# Patient Record
Sex: MALE | Race: WHITE | NOT HISPANIC OR LATINO | Employment: FULL TIME | URBAN - METROPOLITAN AREA
[De-identification: names, ages, dates, MRNs, and addresses within clinical notes are randomized per-mention and may not be internally consistent; named-entity substitution may affect disease eponyms.]

---

## 2017-01-11 DIAGNOSIS — E03.4 HYPOTHYROIDISM DUE TO ACQUIRED ATROPHY OF THYROID: Primary | Chronic | ICD-10-CM

## 2017-01-11 RX ORDER — LEVOTHYROXINE SODIUM 100 UG/1
100 TABLET ORAL DAILY
Qty: 90 TABLET | Refills: 0 | Status: SHIPPED | OUTPATIENT
Start: 2017-01-11 | End: 2017-04-14

## 2017-01-11 NOTE — TELEPHONE ENCOUNTER
levothyroxine      Last Written Prescription Date: 10/31/16  Last Quantity: 90, # refills: 0  Last Office Visit with G, P or [facility] prescribing provider: 10/27/16        TSH   Date Value Ref Range Status   10/28/2016 4.80* 0.40 - 4.00 mU/L Final

## 2017-01-11 NOTE — TELEPHONE ENCOUNTER
Prescription approved per [facility] Refill Protocol or patient Primary care provider (PCP)  ISAAK Tello RN/Real [place]

## 2017-02-16 ENCOUNTER — TRANSFERRED RECORDS (OUTPATIENT)
Dept: HEALTH INFORMATION MANAGEMENT | Facility: CLINIC | Age: 62
End: 2017-02-16

## 2017-03-30 ENCOUNTER — MYC MEDICAL ADVICE (OUTPATIENT)
Dept: FAMILY MEDICINE | Facility: CLINIC | Age: 62
End: 2017-03-30

## 2017-03-30 DIAGNOSIS — E03.4 HYPOTHYROIDISM DUE TO ACQUIRED ATROPHY OF THYROID: Primary | Chronic | ICD-10-CM

## 2017-04-07 ENCOUNTER — TELEPHONE (OUTPATIENT)
Dept: LAB | Facility: CLINIC | Age: 62
End: 2017-04-07

## 2017-04-07 DIAGNOSIS — R53.83 FATIGUE, UNSPECIFIED TYPE: Primary | ICD-10-CM

## 2017-04-07 DIAGNOSIS — E03.4 HYPOTHYROIDISM DUE TO ACQUIRED ATROPHY OF THYROID: Primary | Chronic | ICD-10-CM

## 2017-04-07 DIAGNOSIS — E03.4 HYPOTHYROIDISM DUE TO ACQUIRED ATROPHY OF THYROID: Chronic | ICD-10-CM

## 2017-04-07 LAB
FERRITIN SERPL-MCNC: 165 NG/ML (ref 26–388)
T3FREE SERPL-MCNC: 1.9 PG/ML (ref 2.3–4.2)
T4 FREE SERPL-MCNC: 1.16 NG/DL (ref 0.76–1.46)
TSH SERPL DL<=0.05 MIU/L-ACNC: 3.27 MU/L (ref 0.4–4)

## 2017-04-07 PROCEDURE — 84439 ASSAY OF FREE THYROXINE: CPT | Performed by: FAMILY MEDICINE

## 2017-04-07 PROCEDURE — 86376 MICROSOMAL ANTIBODY EACH: CPT | Performed by: FAMILY MEDICINE

## 2017-04-07 PROCEDURE — 82728 ASSAY OF FERRITIN: CPT | Performed by: NURSE PRACTITIONER

## 2017-04-07 PROCEDURE — 84443 ASSAY THYROID STIM HORMONE: CPT | Performed by: FAMILY MEDICINE

## 2017-04-07 PROCEDURE — 36415 COLL VENOUS BLD VENIPUNCTURE: CPT | Performed by: FAMILY MEDICINE

## 2017-04-07 PROCEDURE — 84481 FREE ASSAY (FT-3): CPT | Performed by: FAMILY MEDICINE

## 2017-04-07 NOTE — TELEPHONE ENCOUNTER
I drew Jet on Friday, 4/7/17. He ask to have his ferritin level checked. I told him that I would ask you. If you would like to add this on please place a future order. Thank You

## 2017-04-07 NOTE — PROGRESS NOTES
Jet,    I am helping to cover some of Dr. Boyce results since she is out of the office.     Normal/Negative ferritin    Please call or email with any additional questions or concerns  DURGA Arreola CNP

## 2017-04-10 LAB — THYROPEROXIDASE AB SERPL-ACNC: 317 IU/ML

## 2017-04-14 DIAGNOSIS — E03.4 HYPOTHYROIDISM DUE TO ACQUIRED ATROPHY OF THYROID: Chronic | ICD-10-CM

## 2017-04-14 RX ORDER — LEVOTHYROXINE SODIUM 100 UG/1
100 TABLET ORAL DAILY
Qty: 90 TABLET | Refills: 1 | Status: SHIPPED | OUTPATIENT
Start: 2017-04-14 | End: 2017-09-23

## 2017-04-14 NOTE — TELEPHONE ENCOUNTER
Levothyroxine 100mcg     Last Written Prescription Date: 01/11/2017  #90 x 0  Last filled - not provided, e-refill request  Last Office Visit with [facility], [facility] or [facility] prescribing provider: 10/27/2016 PEDRO Fraire        TSH   Date Value Ref Range Status   04/07/2017 3.27 0.40 - 4.00 mU/L Final

## 2017-04-14 NOTE — TELEPHONE ENCOUNTER
Prescription approved per [facility] Refill Protocol.  Vicky Marin RN

## 2017-09-23 DIAGNOSIS — E03.4 HYPOTHYROIDISM DUE TO ACQUIRED ATROPHY OF THYROID: Chronic | ICD-10-CM

## 2017-09-25 RX ORDER — LEVOTHYROXINE SODIUM 100 UG/1
TABLET ORAL
Qty: 90 TABLET | Refills: 0 | Status: SHIPPED | OUTPATIENT
Start: 2017-09-25 | End: 2017-12-11

## 2017-09-25 NOTE — TELEPHONE ENCOUNTER
Levothyroxine 100mcg     Last Written Prescription Date: 04/14/2017 #90 x 1  Last filled 06/26/2017  Last Office Visit with G, P or [facility] prescribing provider: 10/27/2016 PEDRO Fraire        TSH   Date Value Ref Range Status   04/07/2017 3.27 0.40 - 4.00 mU/L Final

## 2017-10-25 ENCOUNTER — TELEPHONE (OUTPATIENT)
Dept: FAMILY MEDICINE | Facility: CLINIC | Age: 62
End: 2017-10-25

## 2017-10-25 DIAGNOSIS — E78.5 HYPERLIPIDEMIA LDL GOAL <130: ICD-10-CM

## 2017-10-30 RX ORDER — SIMVASTATIN 20 MG
TABLET ORAL
Qty: 90 TABLET | Refills: 0 | Status: SHIPPED | OUTPATIENT
Start: 2017-10-30 | End: 2018-01-30

## 2017-10-30 NOTE — TELEPHONE ENCOUNTER
Left detailed message for patient to return call to clinic on his self identified voicemail, he is due for OV and fasting labs, both >1 year ago, patient to let clinic know if he is out of Simvastatin    Erika VALENZUELA Rn

## 2017-10-30 NOTE — TELEPHONE ENCOUNTER
Medication is being filled for 1 time refill only due to: [facility]chart [facility] to [facility] and get labs done  Over due for office visit and/or labs   ISAAK Tello  RN/Real [place]

## 2017-12-01 ENCOUNTER — TELEPHONE (OUTPATIENT)
Dept: LAB | Facility: CLINIC | Age: 62
End: 2017-12-01

## 2017-12-01 ENCOUNTER — TELEPHONE (OUTPATIENT)
Dept: FAMILY MEDICINE | Facility: CLINIC | Age: 62
End: 2017-12-01

## 2017-12-01 DIAGNOSIS — E78.5 HYPERLIPIDEMIA LDL GOAL <130: Primary | Chronic | ICD-10-CM

## 2017-12-01 DIAGNOSIS — Z12.5 SCREENING FOR PROSTATE CANCER: ICD-10-CM

## 2017-12-01 DIAGNOSIS — E03.4 HYPOTHYROIDISM DUE TO ACQUIRED ATROPHY OF THYROID: Chronic | ICD-10-CM

## 2017-12-01 NOTE — TELEPHONE ENCOUNTER
Patient coming in for lab work on Monday. It addition to labs already entered, patient would like the following labs done as well: Thyroid T3 and T4, Liver function test.    Please enter lab orders.

## 2017-12-01 NOTE — TELEPHONE ENCOUNTER
Patient coming in for lab work on Monday, 12/04/17.         Patient is requesting fasting labs.      Please place future orders. Thanks

## 2017-12-01 NOTE — TELEPHONE ENCOUNTER
I ordered the ALT which is a liver test and is typically monitored periodically with his simvastatin.    The T3 and T4 are not really indicated.  We did those last year based on some additional concerns/questions he was having.  I would not recommend they be done yearly as part of his monitoring.    If he has additional concerns he would lie to address it might be best for us to visit first.  The lipid panel is the only lab that needs to be fasting.    Antonia Fraire

## 2017-12-01 NOTE — TELEPHONE ENCOUNTER
Left a detailed message for patient with Dr. Fraire's response below. It was a personal mobile phone that I left the message on. Gave patient information to call us back with any further questions or concerns.    Vicky Marin RN

## 2017-12-01 NOTE — TELEPHONE ENCOUNTER
Routing to Dr. Fraire to review and order if the requested labs are necessary.    Vicky Marin RN

## 2017-12-04 DIAGNOSIS — Z12.5 SCREENING FOR PROSTATE CANCER: ICD-10-CM

## 2017-12-04 DIAGNOSIS — E03.4 HYPOTHYROIDISM DUE TO ACQUIRED ATROPHY OF THYROID: Chronic | ICD-10-CM

## 2017-12-04 DIAGNOSIS — E78.5 HYPERLIPIDEMIA LDL GOAL <130: Chronic | ICD-10-CM

## 2017-12-04 DIAGNOSIS — R53.83 FATIGUE, UNSPECIFIED TYPE: ICD-10-CM

## 2017-12-04 LAB
ALT SERPL W P-5'-P-CCNC: 34 U/L (ref 0–70)
CHOLEST SERPL-MCNC: 157 MG/DL
FERRITIN SERPL-MCNC: 130 NG/ML (ref 26–388)
HDLC SERPL-MCNC: 82 MG/DL
LDLC SERPL CALC-MCNC: 63 MG/DL
NONHDLC SERPL-MCNC: 75 MG/DL
PSA SERPL-ACNC: 0.82 UG/L (ref 0–4)
TRIGL SERPL-MCNC: 59 MG/DL
TSH SERPL DL<=0.005 MIU/L-ACNC: 0.42 MU/L (ref 0.4–4)

## 2017-12-04 PROCEDURE — G0103 PSA SCREENING: HCPCS | Performed by: FAMILY MEDICINE

## 2017-12-04 PROCEDURE — 84443 ASSAY THYROID STIM HORMONE: CPT | Performed by: FAMILY MEDICINE

## 2017-12-04 PROCEDURE — 36415 COLL VENOUS BLD VENIPUNCTURE: CPT | Performed by: FAMILY MEDICINE

## 2017-12-04 PROCEDURE — 80061 LIPID PANEL: CPT | Performed by: FAMILY MEDICINE

## 2017-12-04 PROCEDURE — 82728 ASSAY OF FERRITIN: CPT | Performed by: FAMILY MEDICINE

## 2017-12-04 PROCEDURE — 84460 ALANINE AMINO (ALT) (SGPT): CPT | Performed by: FAMILY MEDICINE

## 2017-12-11 ENCOUNTER — OFFICE VISIT (OUTPATIENT)
Dept: FAMILY MEDICINE | Facility: CLINIC | Age: 62
End: 2017-12-11
Payer: COMMERCIAL

## 2017-12-11 VITALS
HEART RATE: 72 BPM | BODY MASS INDEX: 20.88 KG/M2 | WEIGHT: 133 LBS | HEIGHT: 67 IN | TEMPERATURE: 97.4 F | DIASTOLIC BLOOD PRESSURE: 72 MMHG | SYSTOLIC BLOOD PRESSURE: 102 MMHG

## 2017-12-11 DIAGNOSIS — E78.5 HYPERLIPIDEMIA LDL GOAL <130: Chronic | ICD-10-CM

## 2017-12-11 DIAGNOSIS — E03.4 HYPOTHYROIDISM DUE TO ACQUIRED ATROPHY OF THYROID: Primary | Chronic | ICD-10-CM

## 2017-12-11 PROCEDURE — 99213 OFFICE O/P EST LOW 20 MIN: CPT | Performed by: FAMILY MEDICINE

## 2017-12-11 RX ORDER — NIACINAMIDE 500 MG
1 TABLET, EXTENDED RELEASE ORAL 3 TIMES DAILY
COMMUNITY

## 2017-12-11 RX ORDER — ZINC OXIDE 13 %
1-2 CREAM (GRAM) TOPICAL DAILY
COMMUNITY

## 2017-12-11 NOTE — NURSING NOTE
"Initial /72  Pulse 72  Temp 97.4  F (36.3  C) (Tympanic)  Ht 5' 7.25\" (1.708 m)  Wt 133 lb (60.3 kg)  BMI 20.68 kg/m2 Estimated body mass index is 20.68 kg/(m^2) as calculated from the following:    Height as of this encounter: 5' 7.25\" (1.708 m).    Weight as of this encounter: 133 lb (60.3 kg). .      "

## 2017-12-11 NOTE — PROGRESS NOTES
"  SUBJECTIVE:   Jet Granger is a 62 year old male who presents to clinic today for the following health issues:    Hyperlipidemia Follow-Up      Rate your low fat/cholesterol diet?: good    Taking statin?  Yes, no muscle aches from statin    Other lipid medications/supplements?:  Fish oil/Omega 3, without side effects    Hypothyroidism Follow-up      Since last visit, patient describes the following symptoms: Weight stable, no hair loss, no skin changes, no constipation, no loose stools      Has been seeing a functional doctor for treatment due to a variety of symptoms such as insomnia, GI distress and fatigue.  Doing lots of different things but overall has been feeling better.  Energy is somewhat improved and GI distress essentially resolved.    Taking 130grains daily.      Has been on Natur-throid through his functional doctor.        Was treated for small intestineal bacterial overgrowth.  Has been trying to \"get his bowels in shape\".    Feels sleep is better.      Amount of exercise or physical activity: 6-7 days/week for an average of 30-45 minutes    Problems taking medications regularly: No    Medication side effects: none    Diet: regular (no restrictions)      Problem list and histories reviewed & adjusted, as indicated.  Additional history: as documented    Reviewed and updated as needed this visit by clinical staffTobacco  Allergies  Meds  Med Hx  Surg Hx  Fam Hx  Soc Hx      Reviewed and updated as needed this visit by Provider  Tobacco  Med Hx  Surg Hx  Fam Hx  Soc Hx      ROS: Remainder of Constitutional, CV, Respiratory, GI,  negative with exception of that mentioned above    PE:  VS as above   Gen:  WN/WD/WH male in NAD   Heart:  RRR without murmur, nl S1, S2, no rubs or gallops   Lungs CTA se without rales/ronchi/wheezes   Ext:  No pedal edema    A/p:      ICD-10-CM    1. Hypothyroidism due to acquired atrophy of thyroid E03.4    2. Hyperlipidemia LDL goal <130 E78.5      Pt doing " well.  Appears to be biochemically and clinically euthyroid.  Will continue his current management and f/u as needed.

## 2017-12-11 NOTE — MR AVS SNAPSHOT
"              After Visit Summary   12/11/2017    Jet Granger    MRN: [id number]           Patient Information     Date Of Birth          1955        Visit Information        Provider Department      12/11/2017 9:40 AM Antonia Fraire,  [facility]        Today's Diagnoses     Hypothyroidism due to acquired atrophy of thyroid    -  1    Hyperlipidemia LDL goal <130           Follow-ups after your visit        Who to contact     Normal or non-critical lab and imaging results will be communicated to you by [employer], letter or phone within 4 business days after the clinic has received the results. If you do not hear from us within 7 days, please contact the clinic through [employer] or phone. If you have a critical or abnormal lab result, we will notify you by phone as soon as possible.  Submit refill requests through [employer] or call your pharmacy and they will forward the refill request to us. Please allow 3 business days for your refill to be completed.          If you need to speak with a  for additional information , please call: [phone]           Additional Information About Your Visit        [employer] Information     [employer] gives you secure access to your electronic health record. If you see a primary care provider, you can also send messages to your care team and make appointments. If you have questions, please call your primary care clinic.  If you do not have a primary care provider, please call [phone] and they will assist you.        Care EveryWhere ID     This is your Care EveryWhere ID. This could be used by other organizations to access your [facility] medical records  [id number]        Your Vitals Were     Pulse Temperature Height BMI (Body Mass Index)          72 97.4  F (36.3  C) (Tympanic) 5' 7.25\" (1.708 m) 20.68 kg/m2         Blood Pressure from Last 3 Encounters:   12/11/17 102/72   10/27/16 122/72   07/24/15 116/72    Weight from Last 3 Encounters: "   12/11/17 133 lb (60.3 kg)   10/27/16 149 lb 9.6 oz (67.9 kg)   07/24/15 150 lb (68 kg)              Today, you had the following     No orders found for display         Today's Medication Changes          These changes are accurate as of: 12/11/17 11:59 PM.  If you have any questions, ask your nurse or doctor.               Stop taking these medicines if you haven't already. Please contact your care team if you have questions.     CALCIUM + D PO   Stopped by:  Antonia Fraire DO           VITAMIN D PO   Stopped by:  Antonia Fraire DO                    Primary Care Provider Office Phone # Fax #    Alexa Clemons [phone] [address] [place] [address]        Equal Access to Services     ORLANDO GIFFORD : Jack armas Sosaitsh, waaxda luqadaha, qaybta kaalmada ademarlenyyabelem, katelyn lopez . So [facility] [phone].    ATENCIÓN: Si habla español, tiene a valentin disposición servicios gratuitos de asistencia lingüística. Llame al [phone].    We comply with applicable federal civil rights laws and [place] laws. We do not discriminate on the basis of race, color, national origin, age, disability, sex, sexual orientation, or gender identity.            Thank you!     Thank you for choosing [facility]  for your care. Our goal is always to provide you with excellent care. Hearing back from our patients is one way we can continue to improve our services. Please take a few minutes to complete the written survey that you may receive in the mail after your visit with us. Thank you!             Your Updated Medication List - Protect others around you: Learn how to safely use, store and throw away your medicines at www.disposemymeds.org.          This list is accurate as of: 12/11/17 11:59 PM.  Always use your most recent med list.                   Brand Name Dispense Instructions for use Diagnosis    ADRENAL COMPLEX PO      Take 1 tablet by  mouth daily        aspirin 81 MG tablet      1 TABLET DAILY        BENADRYL ALLERGY PO      as needed for sleep        Digestive Enzymes Caps      Take by mouth 3 times daily        fish oil-omega-3 fatty acids 1000 MG capsule      1- 2 TABLET DAILY        HERBALS      Naturethroid - 65 grains, 2 tablets daily        magnesium 500 MG Tabs      1 TABLET DAILY        MELATONIN PO      Take by mouth nightly as needed        MULTIVITAMIN TABS   OR      1 TABLET DAILY        PROBIOTIC DAILY Caps      Take 1-2 capsules by mouth daily        sildenafil 100 MG tablet    VIAGRA    12 tablet    Take 0.5-1 tablets ( mg) by mouth daily as needed for erectile dysfunction    Erectile dysfunction       simvastatin 20 MG tablet    ZOCOR    90 tablet    TAKE 1 TABLET AT BEDTIME    Hyperlipidemia LDL goal <130       vitamin B complex with vitamin C Tabs tablet      Take 1 tablet by mouth daily

## 2017-12-24 DIAGNOSIS — E03.9 HYPOTHYROIDISM: Primary | Chronic | ICD-10-CM

## 2017-12-27 RX ORDER — LEVOTHYROXINE SODIUM 100 UG/1
TABLET ORAL
Qty: 90 TABLET | Refills: 3 | Status: SHIPPED | OUTPATIENT
Start: 2017-12-27 | End: 2018-06-13 | Stop reason: ALTCHOICE

## 2017-12-27 NOTE — TELEPHONE ENCOUNTER
TSH   Date Value Ref Range Status   12/04/2017 0.42 0.40 - 4.00 mU/L Final   ]    Prescription approved per [facility] Refill Protocol or patient Primary care provider (PCP)  ISAAK Tello  RN/Real [place]

## 2018-01-30 DIAGNOSIS — E78.5 HYPERLIPIDEMIA LDL GOAL <130: ICD-10-CM

## 2018-01-31 RX ORDER — SIMVASTATIN 20 MG
20 TABLET ORAL AT BEDTIME
Qty: 90 TABLET | Refills: 3 | Status: SHIPPED | OUTPATIENT
Start: 2018-01-31 | End: 2019-01-28

## 2018-06-12 ENCOUNTER — MYC MEDICAL ADVICE (OUTPATIENT)
Dept: FAMILY MEDICINE | Facility: CLINIC | Age: 63
End: 2018-06-12

## 2018-06-12 DIAGNOSIS — E03.4 HYPOTHYROIDISM DUE TO ACQUIRED ATROPHY OF THYROID: Primary | Chronic | ICD-10-CM

## 2018-11-15 ENCOUNTER — OFFICE VISIT (OUTPATIENT)
Dept: FAMILY MEDICINE | Facility: CLINIC | Age: 63
End: 2018-11-15
Payer: COMMERCIAL

## 2018-11-15 VITALS
SYSTOLIC BLOOD PRESSURE: 138 MMHG | BODY MASS INDEX: 23.32 KG/M2 | WEIGHT: 148.6 LBS | DIASTOLIC BLOOD PRESSURE: 80 MMHG | HEIGHT: 67 IN | TEMPERATURE: 97.2 F | HEART RATE: 60 BPM

## 2018-11-15 DIAGNOSIS — N50.812 TESTICULAR PAIN, LEFT: ICD-10-CM

## 2018-11-15 DIAGNOSIS — R10.32 INGUINAL PAIN, LEFT: Primary | ICD-10-CM

## 2018-11-15 DIAGNOSIS — Z23 NEED FOR PROPHYLACTIC VACCINATION AND INOCULATION AGAINST INFLUENZA: ICD-10-CM

## 2018-11-15 PROCEDURE — 90682 RIV4 VACC RECOMBINANT DNA IM: CPT | Performed by: FAMILY MEDICINE

## 2018-11-15 PROCEDURE — 99213 OFFICE O/P EST LOW 20 MIN: CPT | Mod: 25 | Performed by: FAMILY MEDICINE

## 2018-11-15 PROCEDURE — 90471 IMMUNIZATION ADMIN: CPT | Performed by: FAMILY MEDICINE

## 2018-11-15 NOTE — PROGRESS NOTES
SUBJECTIVE:   Jet Granger is a 63 year old male who presents to clinic today for the following health issues:    * left sided groin pain for the last 3 months, no known injury  * left testicle pain with exercise for the last 3 months    Jet has been having several months of pain in the left groin and left testicle. He has a past history of bilateral inguinal hernia repairs, right side as a teenager, right recurrent 90s, left 2008. His groin pain seems to be worse when he is exercising, particularly abdominal exercises. He does not feel a bulge. He did not feel a rip or tear. He describes the pain as a dull ache at the site of his incision for the left open repair. No bowel changes. Denies N/V.    His left testicle has been uncomfortablel. Present when he is beginning to exercise (walking or running) then gets better/resolves within a few minutes of continuing exercise. No masses or tenderness. He does his own testicle examinations and has not noticed any changes. No urinary symptoms. He previously had a hydrocele that did not feel the same as this. He has been well. No fever. The pain has not gotten worse. It has not been constant ever.  Describes more as a discomfort.  Present only on exercise as above    Problem list and histories reviewed & adjusted, as indicated.  Additional history: as documented    Patient Active Problem List   Diagnosis     Hypothyroidism     RT Hydrocele of Testis     Hyperlipidemia LDL goal <130     Indirect inguinal hernia     z24 hour info given     Insomnia     Past Surgical History:   Procedure Laterality Date     COLONOSCOPY  06/07/06    Excessivly long & redundant colon, grossly normal. Internal hemorrhoids.      COLONOSCOPY  6/15/2011    Procedure:COLONOSCOPY; Surgeon:CHRISTOPHER GEORGES; Location:[facility]     ORTHOPEDIC SURGERY  2008    L achilles tendon repair     SOFT TISSUE SURGERY  2000    cyst removed from foot     SOFT TISSUE SURGERY  1985    pilonidal cyst     SURGICAL  "HISTORY OF -   1981    left knee     SURGICAL HISTORY OF -   2011    hernia repair     SURGICAL HISTORY OF -   2011    hydrocele repair       Social History   Substance Use Topics     Smoking status: Never Smoker     Smokeless tobacco: Never Used     Alcohol use Yes      Comment: few weekly     Family History   Problem Relation Age of Onset     Hypertension Mother      Cerebrovascular Disease Mother 87     obstructed carotid artery     C.A.D. Father 51     MI      C.A.D. Paternal Grandfather 63     MI      Diabetes No family hx of      Breast Cancer No family hx of      Cancer - colorectal No family hx of      Prostate Cancer No family hx of      Asthma No family hx of            Reviewed and updated as needed this visit by clinical staff  Tobacco  Allergies  Meds  Med Hx  Surg Hx  Fam Hx  Soc Hx      Reviewed and updated as needed this visit by Provider  Tobacco  Med Hx  Surg Hx  Fam Hx  Soc Hx        ROS:  Constitutional, HEENT, cardiovascular, pulmonary, gi and gu systems are negative, except as otherwise noted.    OBJECTIVE:     /80  Pulse 60  Temp 97.2  F (36.2  C) (Tympanic)  Ht 5' 7.25\" (1.708 m)  Wt 148 lb 9.6 oz (67.4 kg)  BMI 23.1 kg/m2  Body mass index is 23.1 kg/(m^2).  GENERAL: healthy, alert and no distress  Left groin: well healed open inguinal hernia incision, no fascial defect palpable, no masses at rest or on valsalva   (male): testicles normal without atrophy or masses, no epididymal enlargement, no hydrocele present, no varicocele, no hernias and no tenderness to palpation    Diagnostic Test Results:  none     ASSESSMENT/PLAN:       ICD-10-CM    1. Inguinal pain, left R10.32 GENERAL SURG ADULT REFERRAL   2. Testicular pain, left N50.812    3. Need for prophylactic vaccination and inoculation against influenza Z23 FLU VACCINE, (RIV4) RECOMBINANT HA  , IM (FluBlok, egg free) [98175]- >18 YRS ([facility] recommended  50-64 YRS)     Vaccine Administration, Initial [24203]       Very " high index of suspicion for recurrent hernia or problem with hernia repair despite a grossly normal exam given location and nature of pain. General surgery consult.    Unclear etiology of testicular pain, only present at onset of activity.  Present over months with no worsening.  No masses, exam wnl.   Consider consultation with urology.  Pt wishes to consult with a personal friend who is a urologist and will let us know if referral is necessary      Antonia Fraire, DO  [facility]      Injectable Influenza Immunization Documentation    1.  Is the person to be vaccinated sick today?   No    2. Does the person to be vaccinated have an allergy to a component   of the vaccine?   No  Egg Allergy Algorithm Link    3. Has the person to be vaccinated ever had a serious reaction   to influenza vaccine in the past?   No    4. Has the person to be vaccinated ever had Guillain-Barré syndrome?   No    Form completed by Lorna Romero CMA

## 2018-11-15 NOTE — MR AVS SNAPSHOT
After Visit Summary   11/15/2018    Jet Granger    MRN: [id number]           Patient Information     Date Of Birth          1955        Visit Information        Provider Department      11/15/2018 3:40 PM Antonia Fraire DO [facility]        Today's Diagnoses     Inguinal pain, left    -  1       Follow-ups after your visit        Additional Services     GENERAL SURG ADULT REFERRAL       Your provider has referred you to: [facility]: [facility] [phone]   [email or web address]    Please be aware that coverage of these services is subject to the terms and limitations of your health insurance plan.  Call member services at your health plan with any benefit or coverage questions.      Please bring the following with you to your appointment:    (1) Any X-Rays, CTs or MRIs which have been performed.  Contact the facility where they were done to arrange for  prior to your scheduled appointment.   (2) List of current medications   (3) This referral request   (4) Any documents/labs given to you for this referral                  Who to contact     Normal or non-critical lab and imaging results will be communicated to you by [employer], letter or phone within 4 business days after the clinic has received the results. If you do not hear from us within 7 days, please contact the clinic through [employer] or phone. If you have a critical or abnormal lab result, we will notify you by phone as soon as possible.  Submit refill requests through [employer] or call your pharmacy and they will forward the refill request to us. Please allow 3 business days for your refill to be completed.          If you need to speak with a  for additional information , please call: [phone]           Additional Information About Your Visit        [employer] Information     [employer] gives you secure access to your electronic health record. If you see a  "primary care provider, you can also send messages to your care team and make appointments. If you have questions, please call your primary care clinic.  If you do not have a primary care provider, please call [phone] and they will assist you.        Care EveryWhere ID     This is your Care EveryWhere ID. This could be used by other organizations to access your [facility] medical records  [id number]        Your Vitals Were     Pulse Temperature Height BMI (Body Mass Index)          60 97.2  F (36.2  C) (Tympanic) 5' 7.25\" (1.708 m) 23.1 kg/m2         Blood Pressure from Last 3 Encounters:   11/15/18 138/80   12/11/17 102/72   10/27/16 122/72    Weight from Last 3 Encounters:   11/15/18 148 lb 9.6 oz (67.4 kg)   12/11/17 133 lb (60.3 kg)   10/27/16 149 lb 9.6 oz (67.9 kg)              We Performed the Following     GENERAL SURG ADULT REFERRAL        Primary Care Provider Office Phone # Fax #    Alexa Clemons [phone] [address] [place] [address]        Equal Access to Services     [facility] Center[facility]DIR[facility] AH: Hadii aad ku hadasho Soewaali, waaxda luqadaha, qaybta kaalmada adeegyada, katelyn fernandez hayjimmyn kenya martinezn ah. So [facility] [phone].    ATENCIÓN: Si habla español, tiene a valentin disposición servicios gratuitos de asistencia lingüística. [facility] [phone].    We comply with applicable federal civil rights laws and [place] laws. We do not discriminate on the basis of race, color, national origin, age, disability, sex, sexual orientation, or gender identity.            Thank you!     Thank you for choosing [facility] [place]  for your care. Our goal is always to provide you with excellent care. Hearing back from our patients is one way we can continue to improve our services. Please take a few minutes to complete the written survey that you may receive in the mail after your visit with us. Thank you!             Your Updated Medication List - Protect others around you: " Learn how to safely use, store and throw away your medicines at www.disposemymeds.org.          This list is accurate as of 11/15/18  4:41 PM.  Always use your most recent med list.                   Brand Name Dispense Instructions for use Diagnosis    ADRENAL COMPLEX PO      Take 1 tablet by mouth daily        aspirin 81 MG tablet      1 TABLET DAILY        BENADRYL ALLERGY PO      as needed for sleep        Digestive Enzymes Caps      Take by mouth 3 times daily        fish oil-omega-3 fatty acids 1000 MG capsule      1- 2 TABLET DAILY        HERBALS      Take by mouth daily thyrosine        magnesium 500 MG Tabs      1 TABLET DAILY        MELATONIN PO      Take by mouth nightly as needed        MULTIVITAMIN TABS   OR      1 TABLET DAILY        NATURE-THROID 162.5 MG Tabs   Generic drug:  Thyroid           PROBIOTIC DAILY Caps      Take 1-2 capsules by mouth daily        sildenafil 100 MG tablet    VIAGRA    12 tablet    Take 0.5-1 tablets ( mg) by mouth daily as needed for erectile dysfunction    Erectile dysfunction       simvastatin 20 MG tablet    ZOCOR    90 tablet    Take 1 tablet (20 mg) by mouth At Bedtime    Hyperlipidemia LDL goal <130       vitamin B complex with vitamin C Tabs tablet      Take 1 tablet by mouth daily

## 2018-11-15 NOTE — NURSING NOTE
"Initial /80  Pulse 60  Temp 97.2  F (36.2  C) (Tympanic)  Ht 5' 7.25\" (1.708 m)  Wt 148 lb 9.6 oz (67.4 kg)  BMI 23.1 kg/m2 Estimated body mass index is 23.1 kg/(m^2) as calculated from the following:    Height as of this encounter: 5' 7.25\" (1.708 m).    Weight as of this encounter: 148 lb 9.6 oz (67.4 kg). .      "

## 2019-01-28 DIAGNOSIS — E78.5 HYPERLIPIDEMIA LDL GOAL <130: ICD-10-CM

## 2019-01-29 RX ORDER — SIMVASTATIN 20 MG
20 TABLET ORAL AT BEDTIME
Qty: 30 TABLET | Refills: 0 | Status: SHIPPED | OUTPATIENT
Start: 2019-01-29 | End: 2019-03-09

## 2019-01-29 NOTE — TELEPHONE ENCOUNTER
"Requested Prescriptions   Pending Prescriptions Disp Refills     simvastatin (ZOCOR) 20 MG tablet [Pharmacy Med Name: SIMVASTATIN TABS 20MG] 90 tablet 3    Last Written Prescription Date:  01/31/2018 #90 x 3  Last filled 11/01/2018  Last office visit: 11/15/2018 PEDRO Fraire   Future Office Visit: None    Sig: TAKE 1 TABLET AT BEDTIME    Statins Protocol Failed - 1/28/2019 12:15 AM       Failed - LDL on file in past 12 months    Recent Labs   Lab Test 12/04/17  0809   LDL 63            Passed - No abnormal creatine kinase in past 12 months    No lab results found.            Passed - Recent (12 mo) or future (30 days) visit within the authorizing provider's specialty    Patient had office visit in the last 12 months or has a visit in the next 30 days with authorizing provider or within the authorizing provider's specialty.  See \"Patient Info\" tab in inbasket, or \"Choose Columns\" in Meds & Orders section of the refill encounter.             Passed - Medication is active on med list       Passed - Patient is age 18 or older          "

## 2019-03-09 DIAGNOSIS — E78.5 HYPERLIPIDEMIA LDL GOAL <130: ICD-10-CM

## 2019-03-11 NOTE — TELEPHONE ENCOUNTER
"Requested Prescriptions   Pending Prescriptions Disp Refills     simvastatin (ZOCOR) 20 MG tablet [Pharmacy Med Name: SIMVASTATIN TABS 20MG]  Last Written Prescription Date:  01/29/19  Last Fill Quantity: 30,  # refills: 0   Last office visit: 11/15/2018 with prescribing provider:  Antonia Fraire   Future Office Visit:     30 tablet 0     Sig: TAKE 1 TABLET AT BEDTIME (NEEDS FOLLOW UP APPOINTMENT FOR THIS MEDICATION)    Statins Protocol Failed - 3/9/2019  9:58 AM       Failed - LDL on file in past 12 months    Recent Labs   Lab Test 12/04/17  0809   LDL 63          Passed - No abnormal creatine kinase in past 12 months    No lab results found.        Passed - Recent (12 mo) or future (30 days) visit within the authorizing provider's specialty    Patient had office visit in the last 12 months or has a visit in the next 30 days with authorizing provider or within the authorizing provider's specialty.  See \"Patient Info\" tab in inbasket, or \"Choose Columns\" in Meds & Orders section of the refill encounter.         Passed - Medication is active on med list       Passed - Patient is age 18 or older          "

## 2019-03-12 RX ORDER — SIMVASTATIN 20 MG
TABLET ORAL
Qty: 30 TABLET | Refills: 0 | Status: SHIPPED | OUTPATIENT
Start: 2019-03-12 | End: 2019-04-18

## 2019-03-12 NOTE — TELEPHONE ENCOUNTER
Routing refill request to provider for review/approval because:  Labs not current:    Jo Diaz RN          negative...

## 2019-03-22 DIAGNOSIS — E78.5 HYPERLIPIDEMIA LDL GOAL <130: Chronic | ICD-10-CM

## 2019-03-22 DIAGNOSIS — N43.3 HYDROCELE OF TESTIS: ICD-10-CM

## 2019-03-22 DIAGNOSIS — Z12.5 SCREENING PSA (PROSTATE SPECIFIC ANTIGEN): ICD-10-CM

## 2019-03-22 DIAGNOSIS — E03.4 HYPOTHYROIDISM DUE TO ACQUIRED ATROPHY OF THYROID: Primary | Chronic | ICD-10-CM

## 2019-03-29 DIAGNOSIS — E03.4 HYPOTHYROIDISM DUE TO ACQUIRED ATROPHY OF THYROID: Chronic | ICD-10-CM

## 2019-03-29 DIAGNOSIS — E78.5 HYPERLIPIDEMIA LDL GOAL <130: Chronic | ICD-10-CM

## 2019-03-29 DIAGNOSIS — Z12.5 SCREENING PSA (PROSTATE SPECIFIC ANTIGEN): ICD-10-CM

## 2019-03-29 LAB
ALBUMIN SERPL-MCNC: 4 G/DL (ref 3.4–5)
ALP SERPL-CCNC: 71 U/L (ref 40–150)
ALT SERPL W P-5'-P-CCNC: 31 U/L (ref 0–70)
ANION GAP SERPL CALCULATED.3IONS-SCNC: 4 MMOL/L (ref 3–14)
AST SERPL W P-5'-P-CCNC: 26 U/L (ref 0–45)
BILIRUB SERPL-MCNC: 0.6 MG/DL (ref 0.2–1.3)
BUN SERPL-MCNC: 16 MG/DL (ref 7–30)
CALCIUM SERPL-MCNC: 9.1 MG/DL (ref 8.5–10.1)
CHLORIDE SERPL-SCNC: 105 MMOL/L (ref 94–109)
CHOLEST SERPL-MCNC: 169 MG/DL
CO2 SERPL-SCNC: 30 MMOL/L (ref 20–32)
CREAT SERPL-MCNC: 0.88 MG/DL (ref 0.66–1.25)
GFR SERPL CREATININE-BSD FRML MDRD: >90 ML/MIN/{1.73_M2}
GLUCOSE SERPL-MCNC: 90 MG/DL (ref 70–99)
HDLC SERPL-MCNC: 64 MG/DL
LDLC SERPL CALC-MCNC: 92 MG/DL
NONHDLC SERPL-MCNC: 105 MG/DL
POTASSIUM SERPL-SCNC: 4.3 MMOL/L (ref 3.4–5.3)
PROT SERPL-MCNC: 7.1 G/DL (ref 6.8–8.8)
PSA SERPL-ACNC: 1.01 UG/L (ref 0–4)
SODIUM SERPL-SCNC: 139 MMOL/L (ref 133–144)
T4 FREE SERPL-MCNC: 0.9 NG/DL (ref 0.76–1.46)
TRIGL SERPL-MCNC: 63 MG/DL
TSH SERPL DL<=0.005 MIU/L-ACNC: <0.01 MU/L (ref 0.4–4)

## 2019-03-29 PROCEDURE — 84439 ASSAY OF FREE THYROXINE: CPT | Performed by: NURSE PRACTITIONER

## 2019-03-29 PROCEDURE — 84443 ASSAY THYROID STIM HORMONE: CPT | Performed by: NURSE PRACTITIONER

## 2019-03-29 PROCEDURE — 80053 COMPREHEN METABOLIC PANEL: CPT | Performed by: NURSE PRACTITIONER

## 2019-03-29 PROCEDURE — 80061 LIPID PANEL: CPT | Performed by: NURSE PRACTITIONER

## 2019-03-29 PROCEDURE — G0103 PSA SCREENING: HCPCS | Performed by: NURSE PRACTITIONER

## 2019-03-29 PROCEDURE — 36415 COLL VENOUS BLD VENIPUNCTURE: CPT | Performed by: NURSE PRACTITIONER

## 2019-04-18 ENCOUNTER — MYC REFILL (OUTPATIENT)
Dept: FAMILY MEDICINE | Facility: CLINIC | Age: 64
End: 2019-04-18

## 2019-04-18 DIAGNOSIS — E78.5 HYPERLIPIDEMIA LDL GOAL <130: ICD-10-CM

## 2019-04-19 RX ORDER — SIMVASTATIN 20 MG
20 TABLET ORAL AT BEDTIME
Qty: 90 TABLET | Refills: 3 | Status: SHIPPED | OUTPATIENT
Start: 2019-04-19 | End: 2020-04-14

## 2019-10-15 ENCOUNTER — IMMUNIZATION (OUTPATIENT)
Dept: FAMILY MEDICINE | Facility: CLINIC | Age: 64
End: 2019-10-15
Payer: COMMERCIAL

## 2019-10-15 DIAGNOSIS — Z23 NEED FOR PROPHYLACTIC VACCINATION AND INOCULATION AGAINST INFLUENZA: Primary | ICD-10-CM

## 2019-10-15 PROCEDURE — 99207 ZZC NO CHARGE NURSE ONLY: CPT

## 2019-10-15 PROCEDURE — 90471 IMMUNIZATION ADMIN: CPT

## 2019-10-15 PROCEDURE — 90682 RIV4 VACC RECOMBINANT DNA IM: CPT

## 2019-11-08 ENCOUNTER — HEALTH MAINTENANCE LETTER (OUTPATIENT)
Age: 64
End: 2019-11-08

## 2020-01-14 ENCOUNTER — TELEPHONE (OUTPATIENT)
Dept: FAMILY MEDICINE | Facility: CLINIC | Age: 65
End: 2020-01-14

## 2020-01-14 NOTE — TELEPHONE ENCOUNTER
"Received hand off from .  Patient called requesting appointment with Dr Clemons.  Reported high BP reading when at [facility] [place] on 1/9/20, 186/101.  Patient has this provider treat hypothyroid with NP thyroid tabs.  Patient states lab showed over treatment was advised to decrease dose.  Was advised to monitor BP over the weekend(172/95, 186/101)  Monday /102, today 128/81.  Patient reports he is on \"many\"suppliments, did stop them yesterday as reading label stated contraindicated with HTN.  Did name a few, Thyrosin, adrenal complex.  Patient states he drinks a lot of water, is well hydrated.  Does have \"mild headache\" today. Otherwise asymptomatic.    Last clinic visit with Dr Fraire 11/15/2018 /80 HR 60.  Patient has clinic visit with Dr Cleomns on 1/20/20.  Reviewed red flag symptoms that would need ED visit.  Patient plans to check BP daily, reviewed parameters(sustained readings) that would need ED visit.  Will forward to Dr Clemons if additional recommendations or if he wants to see patient before Monday(first available).  Jo Diaz RN     "

## 2020-01-14 NOTE — TELEPHONE ENCOUNTER
If he's generally asymptomatic, this can wait until Monday. I agree with stopping the supplements.

## 2020-01-14 NOTE — TELEPHONE ENCOUNTER
Call placed to patient.  Relayed message per Dr Clemons.  Verbalizes understanding of red flag symptoms that would need ED visit.  Jo Diaz RN

## 2020-01-22 ENCOUNTER — OFFICE VISIT (OUTPATIENT)
Dept: FAMILY MEDICINE | Facility: CLINIC | Age: 65
End: 2020-01-22
Payer: COMMERCIAL

## 2020-01-22 VITALS
WEIGHT: 145.38 LBS | TEMPERATURE: 98.8 F | BODY MASS INDEX: 22.82 KG/M2 | HEIGHT: 67 IN | SYSTOLIC BLOOD PRESSURE: 136 MMHG | HEART RATE: 62 BPM | RESPIRATION RATE: 12 BRPM | DIASTOLIC BLOOD PRESSURE: 74 MMHG

## 2020-01-22 DIAGNOSIS — R03.0 ELEVATED BLOOD PRESSURE READING WITHOUT DIAGNOSIS OF HYPERTENSION: Primary | ICD-10-CM

## 2020-01-22 PROCEDURE — 99213 OFFICE O/P EST LOW 20 MIN: CPT | Performed by: FAMILY MEDICINE

## 2020-01-22 ASSESSMENT — PAIN SCALES - GENERAL: PAINLEVEL: NO PAIN (0)

## 2020-01-22 ASSESSMENT — MIFFLIN-ST. JEOR: SCORE: 1417.01

## 2020-01-22 NOTE — PROGRESS NOTES
Subjective     Jet Granger is a 64 year old male who presents to clinic today for the following health issues:    HPI     Elevated Blood Pressure  Patient reports checking blood pressure at home, this is averaging 118-180s/80-100s. He does note that this has begun to decrease over the past week. He is doing a low salt diet and watching what he eats. He notes that he has lose some weight. He does get about 30 minutes of exercise daily. He is not on any current medications for blood pressure. He is wondering if blood pressure is decreasing now that he has stopped taking adrenal complex. He does drink 1 cup of coffee each morning, no tobacco use. FHx Hypertension - mother    BP Readings from Last 6 Encounters:   01/22/20 136/74   11/15/18 138/80   12/11/17 102/72   10/27/16 122/72   07/24/15 116/72   07/21/15 122/72     Wt Readings from Last 4 Encounters:   01/22/20 65.9 kg (145 lb 6 oz)   11/15/18 67.4 kg (148 lb 9.6 oz)   12/11/17 60.3 kg (133 lb)   10/27/16 67.9 kg (149 lb 9.6 oz)       Reviewed and updated as needed this visit by Provider         Review of Systems   ROS COMP: Constitutional, HEENT, cardiovascular, pulmonary, gi and gu systems are negative, except as otherwise noted.    This document serves as a record of the services and decisions personally performed and made by Alexa Clemons MD. It was created on his behalf by Zay Escalona, a trained medical scribe. The creation of this document is based the provider's statements to the medical scribe.  Zay Escalona 11:41 AM January 22, 2020      Objective    /74   Pulse 62   Temp 98.8  F (37.1  C) (Tympanic)   Resp 12   Wt 65.9 kg (145 lb 6 oz)   BMI 22.60 kg/m    Body mass index is 22.6 kg/m .  Physical Exam   GENERAL: Healthy, alert and no distress  EYES: Eyes grossly normal to inspection, conjunctivae and sclerae normal  RESP: Lungs clear to auscultation - no rales, rhonchi or wheezes  CV: Regular rate and rhythm, normal S1 S2, no murmur  MS: No  gross musculoskeletal defects noted, no edema  NEURO: Normal strength and tone, mentation intact and speech normal  PSYCH: Mentation appears normal, affect normal/bright     Diagnostic Test Results:  Labs reviewed in Epic  No results found for any visits on 01/22/20.        Assessment & Plan     (R03.0) Elevated blood pressure reading without diagnosis of hypertension  (primary encounter diagnosis)  Comment: Today, within guidelines.  Plan: Reviewed lifestyle, advise blood pressure check in 3 to 6 months.        There are no Patient Instructions on file for this visit.   Alexa Clemons MD  [facility]

## 2020-04-12 DIAGNOSIS — E78.5 HYPERLIPIDEMIA LDL GOAL <130: ICD-10-CM

## 2020-04-14 RX ORDER — SIMVASTATIN 20 MG
TABLET ORAL
Qty: 90 TABLET | Refills: 0 | Status: SHIPPED | OUTPATIENT
Start: 2020-04-14 | End: 2020-07-13

## 2020-07-12 DIAGNOSIS — E78.5 HYPERLIPIDEMIA LDL GOAL <130: ICD-10-CM

## 2020-07-12 DIAGNOSIS — E03.4 HYPOTHYROIDISM DUE TO ACQUIRED ATROPHY OF THYROID: Primary | ICD-10-CM

## 2020-07-13 RX ORDER — SIMVASTATIN 20 MG
TABLET ORAL
Qty: 90 TABLET | Refills: 0 | Status: SHIPPED | OUTPATIENT
Start: 2020-07-13 | End: 2020-10-12

## 2020-07-13 NOTE — TELEPHONE ENCOUNTER
Refilled x3 months - please call and remind him to schedule his preventative/annual visit and labs    Brianna

## 2020-07-13 NOTE — TELEPHONE ENCOUNTER
Routing refill request to provider for review/approval because:  Labs not current  Lab Results   Component Value Date    CHOL 169 03/29/2019     Lab Results   Component Value Date    HDL 64 03/29/2019     Lab Results   Component Value Date    LDL 92 03/29/2019     Lab Results   Component Value Date    TRIG 63 03/29/2019     Lab Results   Component Value Date    CHOLHDLRATIO 2.5 10/08/2015     Mckayla MARKS RN, BSN

## 2020-09-10 NOTE — TELEPHONE ENCOUNTER
Dr. Clemons,     I set up this patient for fasting lab appointment next week. I placed future orders for Lipid and TSH, can you check to see if he needs any other labs test.     Thanks Letty

## 2020-09-17 DIAGNOSIS — E78.5 HYPERLIPIDEMIA LDL GOAL <130: ICD-10-CM

## 2020-09-17 DIAGNOSIS — E03.4 HYPOTHYROIDISM DUE TO ACQUIRED ATROPHY OF THYROID: ICD-10-CM

## 2020-09-17 LAB
CHOLEST SERPL-MCNC: 175 MG/DL
HDLC SERPL-MCNC: 75 MG/DL
LDLC SERPL CALC-MCNC: 82 MG/DL
NONHDLC SERPL-MCNC: 100 MG/DL
T4 FREE SERPL-MCNC: 0.71 NG/DL (ref 0.76–1.46)
TRIGL SERPL-MCNC: 89 MG/DL
TSH SERPL DL<=0.005 MIU/L-ACNC: 0.16 MU/L (ref 0.4–4)

## 2020-09-17 PROCEDURE — 36415 COLL VENOUS BLD VENIPUNCTURE: CPT | Performed by: FAMILY MEDICINE

## 2020-09-17 PROCEDURE — 84439 ASSAY OF FREE THYROXINE: CPT | Performed by: FAMILY MEDICINE

## 2020-09-17 PROCEDURE — 80061 LIPID PANEL: CPT | Performed by: FAMILY MEDICINE

## 2020-09-17 PROCEDURE — 84443 ASSAY THYROID STIM HORMONE: CPT | Performed by: FAMILY MEDICINE

## 2020-10-11 DIAGNOSIS — E78.5 HYPERLIPIDEMIA LDL GOAL <130: ICD-10-CM

## 2020-10-12 RX ORDER — SIMVASTATIN 20 MG
TABLET ORAL
Qty: 90 TABLET | Refills: 3 | Status: SHIPPED | OUTPATIENT
Start: 2020-10-12 | End: 2021-10-08

## 2020-11-21 ENCOUNTER — TRANSFERRED RECORDS (OUTPATIENT)
Dept: HEALTH INFORMATION MANAGEMENT | Facility: CLINIC | Age: 65
End: 2020-11-21

## 2020-12-06 ENCOUNTER — HEALTH MAINTENANCE LETTER (OUTPATIENT)
Age: 65
End: 2020-12-06

## 2020-12-21 DIAGNOSIS — E03.4 HYPOTHYROIDISM DUE TO ACQUIRED ATROPHY OF THYROID: Primary | ICD-10-CM

## 2021-06-05 ENCOUNTER — HEALTH MAINTENANCE LETTER (OUTPATIENT)
Age: 66
End: 2021-06-05

## 2021-07-31 ENCOUNTER — HEALTH MAINTENANCE LETTER (OUTPATIENT)
Age: 66
End: 2021-07-31

## 2021-09-25 ENCOUNTER — HEALTH MAINTENANCE LETTER (OUTPATIENT)
Age: 66
End: 2021-09-25

## 2021-11-26 NOTE — PATIENT INSTRUCTIONS
Patient Education   Personalized Prevention Plan    For the eyes, not sure. I would recommend seeing an eye doctor, an ophthamologist.   Ophthalmology   [address] [place] [address]   [place] [address]   Phone: [phone]     Time for a colonoscopy, it's been 10 years. If you have not heard from the scheduling office within 2 business days, please call [phone].    Blood pressure is very good.     Blood tests today.     Pneumonia and shingles shot     Good luck the next phase of life.     Yearly check up.     For the Viagra, check the website Good Rx for a coupon, it can be much cheaper.     You are due for the preventive services outlined below.  Your care team is available to assist you in scheduling these services.  If you have already completed any of these items, please share that information with your care team to update in your medical record.  Health Maintenance Due   Topic Date Due     ANNUAL REVIEW OF HM ORDERS  Never done     Discuss Advance Care Planning  Never done     HIV Screening  Never done     Hepatitis C Screening  Never done     Zoster (Shingles) Vaccine (1 of 2) Never done     PHQ-2  01/01/2021     FALL RISK ASSESSMENT  Never done     AORTIC ANEURYSM SCREENING (SYSTEM ASSIGNED)  Never done     Colorectal Cancer Screening  06/15/2021     Thyroid Function Lab  09/17/2021     Pneumococcal Vaccine (1 of 1 - PPSV23) 06/05/2021     Preventive Health Recommendations  See your health care provider every year to    Review health changes.     Discuss preventive care.      Review your medicines if your doctor has prescribed any.    Talk with your health care provider about whether you should have a test to screen for prostate cancer (PSA).    Every 3 years, have a diabetes test (fasting glucose). If you are at risk for diabetes, you should have this test more often.    Every 5 years, have a cholesterol test. Have this test more often if you are at risk for high cholesterol or heart disease.     Every  10 years, have a colonoscopy. Or, have a yearly FIT test (stool test). These exams will check for colon cancer.    Talk to with your health care provider about screening for Abdominal Aortic Aneurysm if you have a family history of AAA or have a history of smoking.    Shots:     Get a flu shot each year.     Get a tetanus shot every 10 years.     Talk to your doctor about your pneumonia vaccines. There are now two you should receive - Pneumovax (PPSV 23) and Prevnar (PCV 13).    Talk to your pharmacist about a shingles vaccine.     Talk to your doctor about the hepatitis B vaccine.    Nutrition:     Eat at least 5 servings of fruits and vegetables each day.     Eat whole-grain bread, whole-wheat pasta and brown rice instead of white grains and rice.     Get adequate Calcium and Vitamin D.     Lifestyle    Exercise for at least 150 minutes a week (30 minutes a day, 5 days a week). This will help you control your weight and prevent disease.     Limit alcohol to one drink per day.     No smoking.     Wear sunscreen to prevent skin cancer.     See your dentist every six months for an exam and cleaning.     See your eye doctor every 1 to 2 years to screen for conditions such as glaucoma, macular degeneration and cataracts.    Personalized Prevention Plan  You are due for the preventive services outlined below.  Your care team is available to assist you in scheduling these services.  If you have already completed any of these items, please share that information with your care team to update in your medical record.  Health Maintenance   Topic Date Due     ANNUAL REVIEW OF HM ORDERS  Never done     ADVANCE CARE PLANNING  Never done     HIV SCREENING  Never done     HEPATITIS C SCREENING  Never done     ZOSTER IMMUNIZATION (1 of 2) Never done     PHQ-2  01/01/2021     FALL RISK ASSESSMENT  Never done     AORTIC ANEURYSM SCREENING (SYSTEM ASSIGNED)  Never done     COLORECTAL CANCER SCREENING  06/15/2021     TSH W/FREE T4  REFLEX  09/17/2021     Pneumococcal Vaccine: 65+ Years (1 of 1 - PPSV23) 06/05/2021     MEDICARE ANNUAL WELLNESS VISIT  11/30/2022     DTAP/TDAP/TD IMMUNIZATION (3 - Td or Tdap) 07/19/2025     LIPID  09/17/2025     INFLUENZA VACCINE  Completed     COVID-19 Vaccine  Completed     Pneumococcal Vaccine: Pediatrics (0 to 5 Years) and At-Risk Patients (6 to 64 Years)  Aged Out     IPV IMMUNIZATION  Aged Out     MENINGITIS IMMUNIZATION  Aged Out     HEPATITIS B IMMUNIZATION  Aged Out

## 2021-11-30 ENCOUNTER — OFFICE VISIT (OUTPATIENT)
Dept: FAMILY MEDICINE | Facility: CLINIC | Age: 66
End: 2021-11-30
Payer: COMMERCIAL

## 2021-11-30 VITALS
SYSTOLIC BLOOD PRESSURE: 138 MMHG | HEIGHT: 67 IN | DIASTOLIC BLOOD PRESSURE: 76 MMHG | TEMPERATURE: 97.5 F | OXYGEN SATURATION: 98 % | RESPIRATION RATE: 20 BRPM | HEART RATE: 62 BPM | BODY MASS INDEX: 22.85 KG/M2 | WEIGHT: 145.6 LBS

## 2021-11-30 DIAGNOSIS — N52.9 ERECTILE DYSFUNCTION, UNSPECIFIED ERECTILE DYSFUNCTION TYPE: ICD-10-CM

## 2021-11-30 DIAGNOSIS — Z71.89 ADVANCE CARE PLANNING: ICD-10-CM

## 2021-11-30 DIAGNOSIS — G47.00 INSOMNIA, UNSPECIFIED TYPE: Chronic | ICD-10-CM

## 2021-11-30 DIAGNOSIS — E78.5 HYPERLIPIDEMIA LDL GOAL <130: Chronic | ICD-10-CM

## 2021-11-30 DIAGNOSIS — Z12.5 SCREENING FOR PROSTATE CANCER: ICD-10-CM

## 2021-11-30 DIAGNOSIS — E03.4 HYPOTHYROIDISM DUE TO ACQUIRED ATROPHY OF THYROID: Chronic | ICD-10-CM

## 2021-11-30 DIAGNOSIS — Z00.00 ENCOUNTER FOR MEDICARE ANNUAL WELLNESS EXAM: Primary | ICD-10-CM

## 2021-11-30 DIAGNOSIS — Z12.11 SCREEN FOR COLON CANCER: ICD-10-CM

## 2021-11-30 LAB
ALBUMIN SERPL-MCNC: 3.9 G/DL (ref 3.4–5)
ALP SERPL-CCNC: 41 U/L (ref 40–150)
ALT SERPL W P-5'-P-CCNC: 33 U/L (ref 0–70)
ANION GAP SERPL CALCULATED.3IONS-SCNC: 2 MMOL/L (ref 3–14)
AST SERPL W P-5'-P-CCNC: 27 U/L (ref 0–45)
BILIRUB SERPL-MCNC: 0.8 MG/DL (ref 0.2–1.3)
BUN SERPL-MCNC: 12 MG/DL (ref 7–30)
CALCIUM SERPL-MCNC: 9.3 MG/DL (ref 8.5–10.1)
CHLORIDE BLD-SCNC: 106 MMOL/L (ref 94–109)
CHOLEST SERPL-MCNC: 170 MG/DL
CO2 SERPL-SCNC: 30 MMOL/L (ref 20–32)
CREAT SERPL-MCNC: 0.85 MG/DL (ref 0.66–1.25)
FASTING STATUS PATIENT QL REPORTED: YES
GFR SERPL CREATININE-BSD FRML MDRD: >90 ML/MIN/1.73M2
GLUCOSE BLD-MCNC: 92 MG/DL (ref 70–99)
HDLC SERPL-MCNC: 89 MG/DL
LDLC SERPL CALC-MCNC: 67 MG/DL
NONHDLC SERPL-MCNC: 81 MG/DL
POTASSIUM BLD-SCNC: 4.5 MMOL/L (ref 3.4–5.3)
PROT SERPL-MCNC: 7.3 G/DL (ref 6.8–8.8)
PSA SERPL-MCNC: 1.52 UG/L (ref 0–4)
SODIUM SERPL-SCNC: 138 MMOL/L (ref 133–144)
TRIGL SERPL-MCNC: 68 MG/DL
TSH SERPL DL<=0.005 MIU/L-ACNC: 0.65 MU/L (ref 0.4–4)

## 2021-11-30 PROCEDURE — G0103 PSA SCREENING: HCPCS | Performed by: FAMILY MEDICINE

## 2021-11-30 PROCEDURE — 80061 LIPID PANEL: CPT | Performed by: FAMILY MEDICINE

## 2021-11-30 PROCEDURE — 36415 COLL VENOUS BLD VENIPUNCTURE: CPT | Performed by: FAMILY MEDICINE

## 2021-11-30 PROCEDURE — 99213 OFFICE O/P EST LOW 20 MIN: CPT | Mod: 25 | Performed by: FAMILY MEDICINE

## 2021-11-30 PROCEDURE — 84443 ASSAY THYROID STIM HORMONE: CPT | Performed by: FAMILY MEDICINE

## 2021-11-30 PROCEDURE — 80053 COMPREHEN METABOLIC PANEL: CPT | Performed by: FAMILY MEDICINE

## 2021-11-30 PROCEDURE — 99397 PER PM REEVAL EST PAT 65+ YR: CPT | Performed by: FAMILY MEDICINE

## 2021-11-30 RX ORDER — HYDROXYZINE HYDROCHLORIDE 25 MG/1
TABLET, FILM COATED ORAL
Qty: 60 TABLET | Refills: 3 | Status: SHIPPED | OUTPATIENT
Start: 2021-11-30 | End: 2022-09-06

## 2021-11-30 RX ORDER — SIMVASTATIN 20 MG
20 TABLET ORAL AT BEDTIME
Qty: 90 TABLET | Refills: 3 | Status: SHIPPED | OUTPATIENT
Start: 2021-11-30 | End: 2022-12-06

## 2021-11-30 RX ORDER — SILDENAFIL 100 MG/1
50-100 TABLET, FILM COATED ORAL DAILY PRN
Qty: 30 TABLET | Refills: 1 | Status: SHIPPED | OUTPATIENT
Start: 2021-11-30

## 2021-11-30 ASSESSMENT — ACTIVITIES OF DAILY LIVING (ADL): CURRENT_FUNCTION: NO ASSISTANCE NEEDED

## 2021-11-30 ASSESSMENT — ENCOUNTER SYMPTOMS
HEARTBURN: 0
FEVER: 0
COUGH: 0
ABDOMINAL PAIN: 0
DIARRHEA: 0
HEMATOCHEZIA: 0
JOINT SWELLING: 0
HEMATURIA: 0
FREQUENCY: 0
ARTHRALGIAS: 0
CHILLS: 0
EYE PAIN: 0
NERVOUS/ANXIOUS: 0
DIZZINESS: 0
HEADACHES: 0
CONSTIPATION: 0

## 2021-11-30 ASSESSMENT — MIFFLIN-ST. JEOR: SCORE: 1411.69

## 2021-11-30 ASSESSMENT — PAIN SCALES - GENERAL: PAINLEVEL: NO PAIN (0)

## 2021-11-30 NOTE — PROGRESS NOTES
"SUBJECTIVE:   CC: Jet Granger is an 66 year old male who presents for preventative health visit.       Patient has been advised of split billing requirements and indicates understanding: Yes   Patient would still like to discuss the following concern(s):  1. Eyes- discharge x 2 months  2. Refills of medication  3. Toe-ingrown toenail      Healthy Habits:     In general, how would you rate your overall health?  Excellent    Frequency of exercise:  6-7 days/week    Duration of exercise:  30-45 minutes    Do you usually eat at least 4 servings of fruit and vegetables a day, include whole grains    & fiber and avoid regularly eating high fat or \"junk\" foods?  Yes    Taking medications regularly:  Yes    Medication side effects:  Not applicable    Ability to successfully perform activities of daily living:  No assistance needed    Home Safety:  No safety concerns identified    Hearing Impairment:  Need to ask people to speak up or repeat themselves and no hearing concerns    In the past 6 months, have you been bothered by leaking of urine?  No    In general, how would you rate your overall mental or emotional health?  Excellent      PHQ-2 Total Score: 0    Additional concerns today:  Yes        Today's PHQ-2 Score:   PHQ-2 ( 1999 Pfizer) 11/30/2021   Q1: Little interest or pleasure in doing things 0   Q2: Feeling down, depressed or hopeless 0   PHQ-2 Score 0   PHQ-2 Total Score (12-17 Years)- Positive if 3 or more points; Administer PHQ-A if positive -   PHQ-2 Score Incomplete       Abuse: Current or Past(Physical, Sexual or Emotional)- No  Do you feel safe in your environment? Yes        Social History     Tobacco Use     Smoking status: Never Smoker     Smokeless tobacco: Never Used   Substance Use Topics     Alcohol use: Yes     Comment: few weekly         Alcohol Use 10/27/2016   Prescreen: >3 drinks/day or >7 drinks/week? The patient does not drink >3 drinks per day nor >7 drinks per week.       Last PSA:   PSA " "  Date Value Ref Range Status   03/29/2019 1.01 0 - 4 ug/L Final     Comment:     Assay Method:  Chemiluminescence using Siemens Vista analyzer       Reviewed orders with patient. Reviewed health maintenance and updated orders accordingly - Yes  Lab work is in process    Reviewed and updated as needed this visit by clinical staff  Tobacco  Allergies  Meds   Med Hx  Surg Hx  Fam Hx  Soc Hx       Reviewed and updated as needed this visit by Provider                   Review of Systems  CONSTITUTIONAL: NEGATIVE for fever, chills, change in weight  INTEGUMENTARY/SKIN: NEGATIVE for worrisome rashes, moles or lesions  EYES: Mattery eyes intermittent x4 months.  Denies eye pain, denies blurred vision.  ENT: NEGATIVE for ear, mouth and throat problems  RESP: NEGATIVE for significant cough or SOB  CV: NEGATIVE for chest pain, palpitations or peripheral edema  GI: NEGATIVE for nausea, abdominal pain, heartburn, or change in bowel habits   male: positive for erectile dysfunction.   MUSCULOSKELETAL: NEGATIVE for significant arthralgias or myalgia  NEURO: NEGATIVE for weakness, dizziness or paresthesias  PSYCHIATRIC: NEGATIVE for changes in mood or affect    OBJECTIVE:   /76   Pulse 62   Temp 97.5  F (36.4  C) (Tympanic)   Resp 20   Ht 1.714 m (5' 7.48\")   Wt 66 kg (145 lb 9.6 oz)   SpO2 98%   BMI 22.48 kg/m      Physical Exam  GENERAL: Healthy, alert and no distress  EYES: Eyes grossly normal to inspection, conjunctivae and sclerae normal  RESP: Lungs clear to auscultation - no rales, rhonchi or wheezes  CV: Regular rate and rhythm, normal S1 S2, no murmur  MS: No gross musculoskeletal defects noted, no edema  NEURO: Normal strength and tone, mentation intact and speech normal  PSYCH: Mentation appears normal, affect normal/bright     Diagnostic Test Results:  Labs reviewed in Epic  Results for orders placed or performed in visit on 11/30/21 (from the past 24 hour(s))   TSH with free T4 reflex   Result " Value Ref Range    TSH 0.65 0.40 - 4.00 mU/L   Comprehensive metabolic panel (BMP + Alb, Alk Phos, ALT, AST, Total. Bili, TP)   Result Value Ref Range    Sodium 138 133 - 144 mmol/L    Potassium 4.5 3.4 - 5.3 mmol/L    Chloride 106 94 - 109 mmol/L    Carbon Dioxide (CO2) 30 20 - 32 mmol/L    Anion Gap 2 (L) 3 - 14 mmol/L    Urea Nitrogen 12 7 - 30 mg/dL    Creatinine 0.85 0.66 - 1.25 mg/dL    Calcium 9.3 8.5 - 10.1 mg/dL    Glucose 92 70 - 99 mg/dL    Alkaline Phosphatase 41 40 - 150 U/L    AST 27 0 - 45 U/L    ALT 33 0 - 70 U/L    Protein Total 7.3 6.8 - 8.8 g/dL    Albumin 3.9 3.4 - 5.0 g/dL    Bilirubin Total 0.8 0.2 - 1.3 mg/dL    GFR Estimate >90 >60 mL/min/1.73m2   PSA, screen   Result Value Ref Range    Prostate Specific Antigen Screen 1.52 0.00 - 4.00 ug/L   Lipid panel reflex to direct LDL Fasting   Result Value Ref Range    Cholesterol 170 <200 mg/dL    Triglycerides 68 <150 mg/dL    Direct Measure HDL 89 >=40 mg/dL    LDL Cholesterol Calculated 67 <=100 mg/dL    Non HDL Cholesterol 81 <130 mg/dL    Patient Fasting > 8hrs? Yes     Narrative    Cholesterol  Desirable:  <200 mg/dL    Triglycerides  Normal:  Less than 150 mg/dL  Borderline High:  150-199 mg/dL  High:  200-499 mg/dL  Very High:  Greater than or equal to 500 mg/dL    Direct Measure HDL  Female:  Greater than or equal to 50 mg/dL   Male:  Greater than or equal to 40 mg/dL    LDL Cholesterol  Desirable:  <100mg/dL  Above Desirable:  100-129 mg/dL   Borderline High:  130-159 mg/dL   High:  160-189 mg/dL   Very High:  >= 190 mg/dL    Non HDL Cholesterol  Desirable:  130 mg/dL  Above Desirable:  130-159 mg/dL  Borderline High:  160-189 mg/dL  High:  190-219 mg/dL  Very High:  Greater than or equal to 220 mg/dL       ASSESSMENT/PLAN:   (Z00.00) Encounter for Medicare annual wellness exam  (primary encounter diagnosis)  Comment: Reviewed the need for periodic screening and healthcare exams.  Plan: REVIEW OF HEALTH MAINTENANCE PROTOCOL ORDERS,          Adult Eye Referral, CANCELED: PNEUMOCOC CONJ         VAC 13 ALVARO (MNVAC) ([id number])        Advise yearly physical.    (Z12.11) Screen for colon cancer  Plan: Adult Gastro Ref - Procedure Only       (Z71.89) Advance care planning  Comment: Reviewed.      (E03.4) Hypothyroidism due to acquired atrophy of thyroid  Comment: Clinically euthyroid.  TSH therapeutic.  Plan: TSH with free T4 reflex        Continue.  1 year refill.    (E78.5) Hyperlipidemia LDL goal <130  Comment: Tolerating moderate intensity statin therapy well.  No documented secondary complications.  Very good panel.  Normal kidney and liver function, no signs of diabetes  Plan: Lipid panel reflex to direct LDL Fasting,         Comprehensive metabolic panel (BMP + Alb, Alk         Phos, ALT, AST, Total. Bili, TP), simvastatin         (ZOCOR) 20 MG tablet        1 year refill    (G47.00) Insomnia, unspecified type  Comment: Ongoing, will try nighttime hydroxyzine.  Plan: hydrOXYzine (ATARAX) 25 MG tablet        Reviewed side effects.    (Z12.5) Screening for prostate cancer  Comment: Normal.  Plan: PSA, screen        Repeat yearly until age 70.    (N52.9) Erectile dysfunction, unspecified erectile dysfunction type  Comment: No contraindications.  Plan: sildenafil (VIAGRA) 100 MG tablet        Reviewed side effects.    Patient Instructions       Patient Education   Personalized Prevention Plan    For the eyes, not sure. I would recommend seeing an eye doctor, an ophthamologist.   Ophthalmology   [address] [place] [address]   [place] [address]   Phone: [phone]     Time for a colonoscopy, it's been 10 years. If you have not heard from the scheduling office within 2 business days, please call [phone].    Blood pressure is very good.     Blood tests today.     Pneumonia and shingles shot     Good luck the next phase of life.     Yearly check up.     For the Viagra, check the website Good Rx for a coupon, it can be much cheaper.         Patient has been  "advised of split billing requirements and indicates understanding: Yes  COUNSELING:   Reviewed preventive health counseling, as reflected in patient instructions       Regular exercise       Healthy diet/nutrition       Vision screening       Colon cancer screening       Prostate cancer screening    Estimated body mass index is 22.48 kg/m  as calculated from the following:    Height as of this encounter: 1.714 m (5' 7.48\").    Weight as of this encounter: 66 kg (145 lb 9.6 oz).         He reports that he has never smoked. He has never used smokeless tobacco.      Counseling Resources:  ATP IV Guidelines  Pooled Cohorts Equation Calculator  FRAX Risk Assessment  ICSI Preventive Guidelines  Dietary Guidelines for Americans, 2010  USDA's MyPlate  ASA Prophylaxis  Lung CA Screening    Alexa Clemons MD  [facility]  "

## 2022-02-16 DIAGNOSIS — Z11.59 ENCOUNTER FOR SCREENING FOR OTHER VIRAL DISEASES: Primary | ICD-10-CM

## 2022-03-15 ENCOUNTER — LAB (OUTPATIENT)
Dept: LAB | Facility: CLINIC | Age: 67
End: 2022-03-15
Payer: COMMERCIAL

## 2022-03-15 DIAGNOSIS — Z11.59 ENCOUNTER FOR SCREENING FOR OTHER VIRAL DISEASES: ICD-10-CM

## 2022-03-15 PROCEDURE — U0005 INFEC AGEN DETEC AMPLI PROBE: HCPCS

## 2022-03-15 PROCEDURE — U0003 INFECTIOUS AGENT DETECTION BY NUCLEIC ACID (DNA OR RNA); SEVERE ACUTE RESPIRATORY SYNDROME CORONAVIRUS 2 (SARS-COV-2) (CORONAVIRUS DISEASE [COVID-19]), AMPLIFIED PROBE TECHNIQUE, MAKING USE OF HIGH THROUGHPUT TECHNOLOGIES AS DESCRIBED BY CMS-2020-01-R: HCPCS

## 2022-03-16 LAB — SARS-COV-2 RNA RESP QL NAA+PROBE: NEGATIVE

## 2022-03-17 ENCOUNTER — ANESTHESIA EVENT (OUTPATIENT)
Dept: GASTROENTEROLOGY | Facility: CLINIC | Age: 67
End: 2022-03-17
Payer: COMMERCIAL

## 2022-03-17 NOTE — ANESTHESIA PREPROCEDURE EVALUATION
Anesthesia Pre-Procedure Evaluation    Patient: Jet Granger   MRN: [id number] : 1955        Procedure : Procedure(s):  COLONOSCOPY          Past Medical History:   Diagnosis Date     Syncope and collapse 2006    one episode of fainting-Seen in [place]      Past Surgical History:   Procedure Laterality Date     COLONOSCOPY  06    Excessivly long & redundant colon, grossly normal. Internal hemorrhoids.      COLONOSCOPY  6/15/2011    Procedure:COLONOSCOPY; Surgeon:CHRISTOPHER GEORGES; Location:[facility]     ORTHOPEDIC SURGERY      L achilles tendon repair     SOFT TISSUE SURGERY      cyst removed from foot     SOFT TISSUE SURGERY      pilonidal cyst     SURGICAL HISTORY OF -       left knee     SURGICAL HISTORY OF -       hernia repair     SURGICAL HISTORY OF -       hydrocele repair      No Known Allergies   Social History     Tobacco Use     Smoking status: Never Smoker     Smokeless tobacco: Never Used   Substance Use Topics     Alcohol use: Yes     Comment: few weekly      Wt Readings from Last 1 Encounters:   21 66 kg (145 lb 9.6 oz)        Anesthesia Evaluation   Pt has had prior anesthetic. Type: General.    No history of anesthetic complications       ROS/MED HX  ENT/Pulmonary:  - neg pulmonary ROS     Neurologic:  - neg neurologic ROS     Cardiovascular:     (+) Dyslipidemia -----    METS/Exercise Tolerance:     Hematologic:  - neg hematologic  ROS     Musculoskeletal:  - neg musculoskeletal ROS     GI/Hepatic:     (+) bowel prep,     Renal/Genitourinary:  - neg Renal ROS     Endo:     (+) thyroid problem, hypothyroidism,     Psychiatric/Substance Use:  - neg psychiatric ROS     Infectious Disease:  - neg infectious disease ROS     Malignancy:  - neg malignancy ROS     Other:            Physical Exam    Airway        Mallampati: I   TM distance: > 3 FB   Neck ROM: full   Mouth opening: > 3 cm    Respiratory Devices and Support         Dental  no notable dental history          Cardiovascular   cardiovascular exam normal          Pulmonary   pulmonary exam normal                OUTSIDE LABS:  CBC:   Lab Results   Component Value Date    WBC 4.5 03/04/2010    HGB 13.7 07/08/2011    HGB 14.1 03/04/2010    HCT 40.8 03/04/2010     03/04/2010     BMP:   Lab Results   Component Value Date     11/30/2021     03/29/2019    POTASSIUM 4.5 11/30/2021    POTASSIUM 4.3 03/29/2019    CHLORIDE 106 11/30/2021    CHLORIDE 105 03/29/2019    CO2 30 11/30/2021    CO2 30 03/29/2019    BUN 12 11/30/2021    BUN 16 03/29/2019    CR 0.85 11/30/2021    CR 0.88 03/29/2019    GLC 92 11/30/2021    GLC 90 03/29/2019     COAGS: No results found for: PTT, INR, FIBR  POC: No results found for: BGM, HCG, HCGS  HEPATIC:   Lab Results   Component Value Date    ALBUMIN 3.9 11/30/2021    PROTTOTAL 7.3 11/30/2021    ALT 33 11/30/2021    AST 27 11/30/2021    ALKPHOS 41 11/30/2021    BILITOTAL 0.8 11/30/2021     OTHER:   Lab Results   Component Value Date    KENDRICK 9.3 11/30/2021    TSH 0.65 11/30/2021    T4 0.71 (L) 09/17/2020    SED 7 03/04/2010       Anesthesia Plan    ASA Status:  2   NPO Status:  NPO Appropriate    Anesthesia Type: General.     - Airway: Native airway              Consents    Anesthesia Plan(s) and associated risks, benefits, and realistic alternatives discussed. Questions answered and patient/representative(s) expressed understanding.     - Discussed: Risks, Benefits and Alternatives for BOTH SEDATION and the PROCEDURE were discussed     - Discussed with:  Patient      - Extended Intubation/Ventilatory Support Discussed: No.      - Patient is DNR/DNI Status: No    Use of blood products discussed: No .     Postoperative Care            Comments:                Marvin Irvin CRNA, APRN CRNA

## 2022-03-18 ENCOUNTER — ANESTHESIA (OUTPATIENT)
Dept: GASTROENTEROLOGY | Facility: CLINIC | Age: 67
End: 2022-03-18
Payer: COMMERCIAL

## 2022-03-18 ENCOUNTER — HOSPITAL ENCOUNTER (OUTPATIENT)
Facility: CLINIC | Age: 67
Discharge: HOME | End: 2022-03-18
Attending: SURGERY | Admitting: SURGERY
Payer: COMMERCIAL

## 2022-03-18 VITALS
DIASTOLIC BLOOD PRESSURE: 77 MMHG | BODY MASS INDEX: 21.97 KG/M2 | HEIGHT: 67 IN | WEIGHT: 140 LBS | TEMPERATURE: 97.7 F | SYSTOLIC BLOOD PRESSURE: 111 MMHG | OXYGEN SATURATION: 100 % | HEART RATE: 56 BPM | RESPIRATION RATE: 16 BRPM

## 2022-03-18 LAB — COLONOSCOPY: NORMAL

## 2022-03-18 PROCEDURE — 250N000009 HC RX 250: Performed by: NURSE ANESTHETIST, CERTIFIED REGISTERED

## 2022-03-18 PROCEDURE — 370N000017 HC ANESTHESIA TECHNICAL FEE, PER MIN: Performed by: SURGERY

## 2022-03-18 PROCEDURE — G0121 COLON CA SCRN NOT HI RSK IND: HCPCS | Performed by: SURGERY

## 2022-03-18 PROCEDURE — 45378 DIAGNOSTIC COLONOSCOPY: CPT | Performed by: SURGERY

## 2022-03-18 PROCEDURE — 258N000003 HC RX IP 258 OP 636: Performed by: SURGERY

## 2022-03-18 PROCEDURE — 250N000011 HC RX IP 250 OP 636: Performed by: NURSE ANESTHETIST, CERTIFIED REGISTERED

## 2022-03-18 PROCEDURE — 250N000009 HC RX 250: Performed by: SURGERY

## 2022-03-18 RX ORDER — NALOXONE HYDROCHLORIDE 0.4 MG/ML
0.4 INJECTION, SOLUTION INTRAMUSCULAR; INTRAVENOUS; SUBCUTANEOUS
Status: CANCELLED | OUTPATIENT
Start: 2022-03-18

## 2022-03-18 RX ORDER — SODIUM CHLORIDE, SODIUM LACTATE, POTASSIUM CHLORIDE, CALCIUM CHLORIDE 600; 310; 30; 20 MG/100ML; MG/100ML; MG/100ML; MG/100ML
INJECTION, SOLUTION INTRAVENOUS CONTINUOUS
Status: DISCONTINUED | OUTPATIENT
Start: 2022-03-18 | End: 2022-03-18 | Stop reason: HOSPADM

## 2022-03-18 RX ORDER — GLYCOPYRROLATE 0.2 MG/ML
INJECTION, SOLUTION INTRAMUSCULAR; INTRAVENOUS PRN
Status: DISCONTINUED | OUTPATIENT
Start: 2022-03-18 | End: 2022-03-18

## 2022-03-18 RX ORDER — ONDANSETRON 2 MG/ML
4 INJECTION INTRAMUSCULAR; INTRAVENOUS
Status: DISCONTINUED | OUTPATIENT
Start: 2022-03-18 | End: 2022-03-18 | Stop reason: HOSPADM

## 2022-03-18 RX ORDER — FLUMAZENIL 0.1 MG/ML
0.2 INJECTION, SOLUTION INTRAVENOUS
Status: CANCELLED | OUTPATIENT
Start: 2022-03-18 | End: 2022-03-18

## 2022-03-18 RX ORDER — NALOXONE HYDROCHLORIDE 0.4 MG/ML
0.2 INJECTION, SOLUTION INTRAMUSCULAR; INTRAVENOUS; SUBCUTANEOUS
Status: CANCELLED | OUTPATIENT
Start: 2022-03-18

## 2022-03-18 RX ORDER — LIDOCAINE 40 MG/G
CREAM TOPICAL
Status: DISCONTINUED | OUTPATIENT
Start: 2022-03-18 | End: 2022-03-18 | Stop reason: HOSPADM

## 2022-03-18 RX ORDER — PROPOFOL 10 MG/ML
INJECTION, EMULSION INTRAVENOUS PRN
Status: DISCONTINUED | OUTPATIENT
Start: 2022-03-18 | End: 2022-03-18

## 2022-03-18 RX ORDER — PROPOFOL 10 MG/ML
INJECTION, EMULSION INTRAVENOUS CONTINUOUS PRN
Status: DISCONTINUED | OUTPATIENT
Start: 2022-03-18 | End: 2022-03-18

## 2022-03-18 RX ORDER — LIDOCAINE HYDROCHLORIDE 10 MG/ML
INJECTION, SOLUTION EPIDURAL; INFILTRATION; INTRACAUDAL; PERINEURAL PRN
Status: DISCONTINUED | OUTPATIENT
Start: 2022-03-18 | End: 2022-03-18

## 2022-03-18 RX ADMIN — LIDOCAINE HYDROCHLORIDE 0.5 ML: 10 INJECTION, SOLUTION EPIDURAL; INFILTRATION; INTRACAUDAL; PERINEURAL at 08:42

## 2022-03-18 RX ADMIN — GLYCOPYRROLATE 0.2 MG: 0.2 INJECTION, SOLUTION INTRAMUSCULAR; INTRAVENOUS at 09:10

## 2022-03-18 RX ADMIN — LIDOCAINE HYDROCHLORIDE 50 MG: 10 INJECTION, SOLUTION EPIDURAL; INFILTRATION; INTRACAUDAL; PERINEURAL at 09:07

## 2022-03-18 RX ADMIN — SODIUM CHLORIDE, POTASSIUM CHLORIDE, SODIUM LACTATE AND CALCIUM CHLORIDE: 600; 310; 30; 20 INJECTION, SOLUTION INTRAVENOUS at 08:41

## 2022-03-18 RX ADMIN — PROPOFOL 200 MCG/KG/MIN: 10 INJECTION, EMULSION INTRAVENOUS at 09:09

## 2022-03-18 RX ADMIN — PROPOFOL 100 MG: 10 INJECTION, EMULSION INTRAVENOUS at 09:07

## 2022-03-18 NOTE — ANESTHESIA POSTPROCEDURE EVALUATION
Patient: Jet Granger    Procedure: Procedure(s):  COLONOSCOPY       Anesthesia Type:  General    Note:  Disposition: Outpatient   Postop Pain Control: Uneventful            Sign Out: Well controlled pain   PONV: No   Neuro/Psych: Uneventful            Sign Out: Acceptable/Baseline neuro status   Airway/Respiratory: Uneventful            Sign Out: Acceptable/Baseline resp. status   CV/Hemodynamics: Uneventful            Sign Out: Acceptable CV status; No obvious hypovolemia; No obvious fluid overload   Other NRE: NONE   DID A NON-ROUTINE EVENT OCCUR? No           Last vitals:  Vitals Value Taken Time   /74 03/18/22 0945   Temp     Pulse 54 03/18/22 0945   Resp     SpO2 100 % 03/18/22 0950   Vitals shown include unvalidated device data.    Electronically Signed By: DURGA Lozano CRNA  March 18, 2022  9:51 AM

## 2022-03-18 NOTE — H&P
"66 year old year old male here for colonoscopy for screening.  Last colonoscopy was 2011 and negative. Patient denies blood in stool or change in stool caliber.  There is no known family history of colon cancer or polyps.    Patient Active Problem List   Diagnosis     Hypothyroidism     RT Hydrocele of Testis     Hyperlipidemia LDL goal <130     Indirect inguinal hernia     z24 hour info given     Insomnia       Past Medical History:   Diagnosis Date     Syncope and collapse 9/2006    one episode of fainting-Seen in [place]       Past Surgical History:   Procedure Laterality Date     COLONOSCOPY  06/07/06    Excessivly long & redundant colon, grossly normal. Internal hemorrhoids.      COLONOSCOPY  6/15/2011    Procedure:COLONOSCOPY; Surgeon:CHRISTOPHER GEORGES; Location:[facility]     ORTHOPEDIC SURGERY  2008    L achilles tendon repair     SOFT TISSUE SURGERY  2000    cyst removed from foot     SOFT TISSUE SURGERY  1985    pilonidal cyst     SURGICAL HISTORY OF -   1981    left knee     SURGICAL HISTORY OF -   2011    hernia repair     SURGICAL HISTORY OF -   2011    hydrocele repair       Family History   Problem Relation Age of Onset     Hypertension Mother      Cerebrovascular Disease Mother 87        obstructed carotid artery     C.A.D. Father 51        MI      C.A.D. Paternal Grandfather 63        MI      Diabetes No family hx of      Breast Cancer No family hx of      Cancer - colorectal No family hx of      Prostate Cancer No family hx of      Asthma No family hx of        No current outpatient medications on file.       No Known Allergies    Pt reports that he has never smoked. He has never used smokeless tobacco. He reports current alcohol use. He reports that he does not use drugs.    Exam:  /75 (BP Location: Right arm)   Pulse 55   Temp 97.7  F (36.5  C) (Oral)   Resp 14   Ht 1.702 m (5' 7\")   Wt 63.5 kg (140 lb)   SpO2 100%   BMI 21.93 kg/m      Awake, Alert OX3  Lungs - CTA bilaterally  CV - " RRR, no murmurs, distal pulses intact  Abd - soft, non-distended, non-tender, +BS  Extr - No cyanosis or edema    A/P 66 year old year old male in need of colonoscopy for screening. Risks, benefits, alternatives, and complications were discussed including the possibility of perforation, bleeding, missed lesion and the patient agreed to proceed    [place],  on 3/18/2022 at 8:22 AM

## 2022-03-18 NOTE — ANESTHESIA CARE TRANSFER NOTE
Patient: Jet Granger    Procedure: Procedure(s):  COLONOSCOPY       Diagnosis: Screen for colon cancer [Z12.11]  Diagnosis Additional Information: No value filed.    Anesthesia Type:   General     Note:    Oropharynx: oropharynx clear of all foreign objects and spontaneously breathing  Level of Consciousness: drowsy  Oxygen Supplementation: room air    Independent Airway: airway patency satisfactory and stable  Dentition: dentition unchanged  Vital Signs Stable: post-procedure vital signs reviewed and stable  Report to RN Given: handoff report given  Patient transferred to: Phase II    Handoff Report: Identifed the Patient, Identified the Reponsible Provider, Reviewed the pertinent medical history, Discussed the surgical course, Reviewed Intra-OP anesthesia mangement and issues during anesthesia, Set expectations for post-procedure period and Allowed opportunity for questions and acknowledgement of understanding      Vitals:  Vitals Value Taken Time   BP     Temp     Pulse     Resp     SpO2         Electronically Signed By: DURGA Lozano CRNA  March 18, 2022  9:31 AM

## 2022-04-20 ENCOUNTER — TRANSFERRED RECORDS (OUTPATIENT)
Dept: HEALTH INFORMATION MANAGEMENT | Facility: CLINIC | Age: 67
End: 2022-04-20
Payer: COMMERCIAL

## 2022-06-27 ENCOUNTER — TELEPHONE (OUTPATIENT)
Dept: FAMILY MEDICINE | Facility: CLINIC | Age: 67
End: 2022-06-27

## 2022-06-27 NOTE — TELEPHONE ENCOUNTER
Called patient, an appointment is needed for antivirals. Nurse assisted walking patient through his mychart to get this set up. Patient is scheduled tomorrow.     Francesca Greenwood RN

## 2022-06-27 NOTE — TELEPHONE ENCOUNTER
Patient called tested positive yesterdy for Covid wondering if can get the antivirus.  Shea Nolasco,

## 2022-06-28 ENCOUNTER — VIRTUAL VISIT (OUTPATIENT)
Dept: FAMILY MEDICINE | Facility: CLINIC | Age: 67
End: 2022-06-28
Payer: COMMERCIAL

## 2022-06-28 DIAGNOSIS — U07.1 INFECTION DUE TO 2019 NOVEL CORONAVIRUS: Primary | ICD-10-CM

## 2022-06-28 PROCEDURE — 99213 OFFICE O/P EST LOW 20 MIN: CPT | Mod: 95 | Performed by: NURSE PRACTITIONER

## 2022-06-28 NOTE — PROGRESS NOTES
Jet is a 67 year old who is being evaluated via a billable video visit.      How would you like to obtain your AVS? MyChart  If the video visit is dropped, the invitation should be resent by: Text to cell phone: [phone]  Will anyone else be joining your video visit? No    Assessment & Plan     Infection due to 2019 novel coronavirus  Tolerating symptoms.  No indications for paxlovid.  Monitor, symptomatic care.      No follow-ups on file.    DURGA Nguyen Ra, CNP  [facility]   Jet is a 67 year old, presenting for the following health issues:  Covid Concern    HPI     COVID-19 Symptom Review  How many days ago did these symptoms start? 06/25/2022    Are any of the following symptoms significant for you?    New or worsening difficulty breathing? No    Worsening cough? Yes, it's a dry cough.     Fever or chills? Yes, I felt feverish or had chills.    Headache: YES    Sore throat: YES    Chest pain: no    Diarrhea: YES    Body aches? YES    What treatments has patient tried? Acetaminophen   Does patient live in a nursing home, group home, or shelter? no  Does patient have a way to get food/medications during quarantined? Yes, I have a friend or family member who can help me.    taking in fluids.  Exercises regularly.  Symptoms improving slightly.  Traveling in 4 days.      Review of Systems   Constitutional, HEENT, cardiovascular, pulmonary, gi and gu systems are negative, except as otherwise noted.      Objective           Vitals:  No vitals were obtained today due to virtual visit.    Physical Exam   GENERAL: Healthy, alert and no distress  EYES: Eyes grossly normal to inspection.  No discharge or erythema, or obvious scleral/conjunctival abnormalities.  RESP: No audible wheeze, cough, or visible cyanosis.  No visible retractions or increased work of breathing.    SKIN: Visible skin clear. No significant rash, abnormal pigmentation or lesions.  NEURO: Cranial nerves  grossly intact.  Mentation and speech appropriate for age.  PSYCH: Mentation appears normal, affect normal/bright, judgement and insight intact, normal speech and appearance well-groomed.                Video-Visit Details    Video Start Time: 11:23 AM    Type of service:  Video Visit    Video End Time:11:28 AM    Originating Location (pt. Location): Home    Distant Location (provider location):  [facility]     Platform used for Video Visit: Mary    .  ..

## 2022-08-31 ENCOUNTER — TRANSFERRED RECORDS (OUTPATIENT)
Dept: HEALTH INFORMATION MANAGEMENT | Facility: CLINIC | Age: 67
End: 2022-08-31

## 2022-09-06 ENCOUNTER — VIRTUAL VISIT (OUTPATIENT)
Dept: FAMILY MEDICINE | Facility: CLINIC | Age: 67
End: 2022-09-06
Payer: COMMERCIAL

## 2022-09-06 DIAGNOSIS — I87.8 VENOUS ENGORGEMENT: Primary | ICD-10-CM

## 2022-09-06 PROCEDURE — 99213 OFFICE O/P EST LOW 20 MIN: CPT | Mod: 95 | Performed by: FAMILY MEDICINE

## 2022-09-06 RX ORDER — HYDROXYZINE HYDROCHLORIDE 25 MG/1
TABLET, FILM COATED ORAL
Qty: 60 TABLET | Refills: 3 | Status: SHIPPED | OUTPATIENT
Start: 2022-09-06 | End: 2023-04-19

## 2022-09-06 NOTE — PROGRESS NOTES
Jet is a 67 year old who is being evaluated via a billable video visit.      How would you like to obtain your AVS? MyChart  If the video visit is dropped, the invitation should be resent by: Text to cell phone: [phone]  Will anyone else be joining your video visit? No        Assessment & Plan     (I87.8) Venous engorgement  (primary encounter diagnosis)  Comment: Patient notes a history of working the yard this, bending over and pulling weeds.  I suspect this because venous engorgement of the superficial veins along the temples.  This is resolved, there is no associated headache, blurred vision, doubt temporal arteritis.  Plan: Advised to monitor, if symptoms return, if he notes an unusual headache, visual disturbance he is to follow-up immediately.     Patient Instructions   The swollen veins sounds like transient blood flow to your head from bending over and pulling weeds.  This would tighten your core muscles and force blood to your head.  It can take a while for this blood return to the circulatory system.  This would cause the blood vessels in your temples to become engorged.    Without headache, blurred vision, or other symptoms, its harmless.  Especially since it is resolved.    No further follow-up is needed unless your symptoms change.         Return in about 2 weeks (around 9/20/2022), or if symptoms worsen or fail to improve.    Alexa Clemons MD  [facility] MELANIE Chaudhary is a 67 year old, presenting for the following health issues:  Swelling (Veins on the left side of the face which comes and goes)      History of Present Illness       Reason for visit:  Vein swelling on the left side of the head    He eats 4 or more servings of fruits and vegetables daily.He consumes 1 sweetened beverage(s) daily.He exercises with enough effort to increase his heart rate 30 to 60 minutes per day.  He exercises with enough effort to increase his heart rate 4 days per week.   He is  taking medications regularly.    Review of Systems   CONSTITUTIONAL: NEGATIVE for fever, chills, change in weight  EYES: NEGATIVE for vision changes or irritation  ENT/MOUTH: NEGATIVE for ear, mouth and throat problems  RESP: NEGATIVE for significant cough or SOB  CV: NEGATIVE for chest pain, palpitations or peripheral edema  NEURO: NEGATIVE for weakness, dizziness or paresthesias.  Denies headaches.      Objective    Vitals - Patient Reported  Weight (Patient Reported): 64 kg (141 lb)      Vitals:  No vitals were obtained today due to virtual visit.    Physical Exam   GENERAL: Healthy, alert and no distress  RESP: No audible wheeze, cough  PSYCH: Mentation appears normal, affect normal/bright, judgement and insight intact, normal speech            Video-Visit Details converted to phone visit secondary to technical difficulties.    Duration of phone call: 12 minutes.    Alexa Clemons MD     [unfilled]  [unfilled]

## 2022-09-07 NOTE — PATIENT INSTRUCTIONS
The swollen veins sounds like transient blood flow to your head from bending over and pulling weeds.  This would tighten your core muscles and force blood to your head.  It can take a while for this blood return to the circulatory system.  This would cause the blood vessels in your temples to become engorged.    Without headache, blurred vision, or other symptoms, its harmless.  Especially since it is resolved.    No further follow-up is needed unless your symptoms change.

## 2022-09-27 ENCOUNTER — IMMUNIZATION (OUTPATIENT)
Dept: FAMILY MEDICINE | Facility: CLINIC | Age: 67
End: 2022-09-27
Payer: COMMERCIAL

## 2022-09-27 DIAGNOSIS — Z23 NEED FOR PROPHYLACTIC VACCINATION AND INOCULATION AGAINST INFLUENZA: Primary | ICD-10-CM

## 2022-09-27 PROCEDURE — 90471 IMMUNIZATION ADMIN: CPT

## 2022-09-27 PROCEDURE — 90662 IIV NO PRSV INCREASED AG IM: CPT

## 2022-09-28 ENCOUNTER — ALLIED HEALTH/NURSE VISIT (OUTPATIENT)
Dept: FAMILY MEDICINE | Facility: CLINIC | Age: 67
End: 2022-09-28
Payer: COMMERCIAL

## 2022-09-28 DIAGNOSIS — Z23 HIGH PRIORITY FOR 2019-NCOV VACCINE: Primary | ICD-10-CM

## 2022-09-28 PROCEDURE — 0124A COVID-19,PF,PFIZER BOOSTER BIVALENT: CPT

## 2022-09-28 PROCEDURE — 91312 COVID-19,PF,PFIZER BOOSTER BIVALENT: CPT

## 2022-11-02 ENCOUNTER — TRANSFERRED RECORDS (OUTPATIENT)
Dept: HEALTH INFORMATION MANAGEMENT | Facility: CLINIC | Age: 67
End: 2022-11-02

## 2022-12-05 DIAGNOSIS — E78.5 HYPERLIPIDEMIA LDL GOAL <130: Chronic | ICD-10-CM

## 2022-12-06 RX ORDER — SIMVASTATIN 20 MG
TABLET ORAL
Qty: 90 TABLET | Refills: 3 | Status: SHIPPED | OUTPATIENT
Start: 2022-12-06 | End: 2023-11-29

## 2023-01-07 ENCOUNTER — HEALTH MAINTENANCE LETTER (OUTPATIENT)
Age: 68
End: 2023-01-07

## 2023-04-17 DIAGNOSIS — G47.00 INSOMNIA, UNSPECIFIED TYPE: Primary | ICD-10-CM

## 2023-04-18 ENCOUNTER — TRANSFERRED RECORDS (OUTPATIENT)
Dept: HEALTH INFORMATION MANAGEMENT | Facility: CLINIC | Age: 68
End: 2023-04-18
Payer: COMMERCIAL

## 2023-04-19 RX ORDER — HYDROXYZINE HYDROCHLORIDE 25 MG/1
TABLET, FILM COATED ORAL
Qty: 60 TABLET | Refills: 3 | Status: SHIPPED | OUTPATIENT
Start: 2023-04-19 | End: 2023-05-16

## 2023-05-10 ASSESSMENT — ENCOUNTER SYMPTOMS
MYALGIAS: 0
NERVOUS/ANXIOUS: 0
WEAKNESS: 0
HEARTBURN: 0
COUGH: 0
EYE PAIN: 0
SORE THROAT: 0
DIZZINESS: 0
HEMATURIA: 0
PALPITATIONS: 0
CONSTIPATION: 0
HEMATOCHEZIA: 0
SHORTNESS OF BREATH: 0
ABDOMINAL PAIN: 0
DYSURIA: 0
ARTHRALGIAS: 1
JOINT SWELLING: 0
PARESTHESIAS: 0
CHILLS: 0
FEVER: 0
FREQUENCY: 0
NAUSEA: 0
DIARRHEA: 0
HEADACHES: 0

## 2023-05-10 ASSESSMENT — ACTIVITIES OF DAILY LIVING (ADL): CURRENT_FUNCTION: NO ASSISTANCE NEEDED

## 2023-05-16 ENCOUNTER — OFFICE VISIT (OUTPATIENT)
Dept: FAMILY MEDICINE | Facility: CLINIC | Age: 68
End: 2023-05-16
Payer: COMMERCIAL

## 2023-05-16 VITALS
HEART RATE: 70 BPM | DIASTOLIC BLOOD PRESSURE: 70 MMHG | OXYGEN SATURATION: 98 % | BODY MASS INDEX: 22.51 KG/M2 | TEMPERATURE: 98.1 F | SYSTOLIC BLOOD PRESSURE: 114 MMHG | HEIGHT: 67 IN | WEIGHT: 143.4 LBS

## 2023-05-16 DIAGNOSIS — Z12.5 SCREENING FOR PROSTATE CANCER: ICD-10-CM

## 2023-05-16 DIAGNOSIS — G89.29 CHRONIC PAIN OF BOTH SHOULDERS: ICD-10-CM

## 2023-05-16 DIAGNOSIS — E03.9 ACQUIRED HYPOTHYROIDISM: ICD-10-CM

## 2023-05-16 DIAGNOSIS — Z12.11 SCREEN FOR COLON CANCER: ICD-10-CM

## 2023-05-16 DIAGNOSIS — G47.00 INSOMNIA, UNSPECIFIED TYPE: ICD-10-CM

## 2023-05-16 DIAGNOSIS — Z00.00 MEDICARE ANNUAL WELLNESS VISIT, SUBSEQUENT: Primary | ICD-10-CM

## 2023-05-16 DIAGNOSIS — M25.511 CHRONIC PAIN OF BOTH SHOULDERS: ICD-10-CM

## 2023-05-16 DIAGNOSIS — E78.5 HYPERLIPIDEMIA LDL GOAL <130: ICD-10-CM

## 2023-05-16 DIAGNOSIS — M25.512 CHRONIC PAIN OF BOTH SHOULDERS: ICD-10-CM

## 2023-05-16 PROCEDURE — 84443 ASSAY THYROID STIM HORMONE: CPT | Performed by: FAMILY MEDICINE

## 2023-05-16 PROCEDURE — 36415 COLL VENOUS BLD VENIPUNCTURE: CPT | Performed by: FAMILY MEDICINE

## 2023-05-16 PROCEDURE — 99214 OFFICE O/P EST MOD 30 MIN: CPT | Mod: 25 | Performed by: FAMILY MEDICINE

## 2023-05-16 PROCEDURE — G0103 PSA SCREENING: HCPCS | Performed by: FAMILY MEDICINE

## 2023-05-16 PROCEDURE — 80061 LIPID PANEL: CPT | Performed by: FAMILY MEDICINE

## 2023-05-16 PROCEDURE — 90471 IMMUNIZATION ADMIN: CPT | Performed by: FAMILY MEDICINE

## 2023-05-16 PROCEDURE — 90677 PCV20 VACCINE IM: CPT | Performed by: FAMILY MEDICINE

## 2023-05-16 PROCEDURE — G0439 PPPS, SUBSEQ VISIT: HCPCS | Performed by: FAMILY MEDICINE

## 2023-05-16 RX ORDER — HYDROXYZINE HYDROCHLORIDE 25 MG/1
TABLET, FILM COATED ORAL
Qty: 120 TABLET | Refills: 3 | Status: ON HOLD | OUTPATIENT
Start: 2023-05-16 | End: 2023-11-11

## 2023-05-16 RX ORDER — CYCLOBENZAPRINE HCL 5 MG
5 TABLET ORAL 3 TIMES DAILY PRN
Qty: 60 TABLET | Refills: 3 | Status: SHIPPED | OUTPATIENT
Start: 2023-05-16

## 2023-05-16 ASSESSMENT — ENCOUNTER SYMPTOMS
MYALGIAS: 0
NERVOUS/ANXIOUS: 0
SORE THROAT: 0
NAUSEA: 0
HEMATOCHEZIA: 0
HEMATURIA: 0
DIARRHEA: 0
WEAKNESS: 0
ARTHRALGIAS: 1
HEARTBURN: 0
DYSURIA: 0
ABDOMINAL PAIN: 0
PARESTHESIAS: 0
CONSTIPATION: 0
PALPITATIONS: 0
COUGH: 0
SHORTNESS OF BREATH: 0
HEADACHES: 0
FEVER: 0
DIZZINESS: 0
CHILLS: 0
JOINT SWELLING: 0
FREQUENCY: 0
EYE PAIN: 0

## 2023-05-16 ASSESSMENT — ACTIVITIES OF DAILY LIVING (ADL): CURRENT_FUNCTION: NO ASSISTANCE NEEDED

## 2023-05-16 ASSESSMENT — PAIN SCALES - GENERAL: PAINLEVEL: NO PAIN (1)

## 2023-05-16 NOTE — PROGRESS NOTES
"SUBJECTIVE:   Jet is a 67 year old who presents for Preventive Visit.       View : No data to display.                  5/16/2023     4:43 PM   Patient Reported Additional Medications   Patient reports taking the following new medications NP Thyroid 90 mcg   Patient has been advised of split billing requirements and indicates understanding: Yes  Are you in the first 12 months of your Medicare coverage?  No    Healthy Habits:     In general, how would you rate your overall health?  Excellent    Frequency of exercise:  4-5 days/week    Duration of exercise:  45-60 minutes    Do you usually eat at least 4 servings of fruit and vegetables a day, include whole grains    & fiber and avoid regularly eating high fat or \"junk\" foods?  Yes    Taking medications regularly:  Yes    Medication side effects:  None    Ability to successfully perform activities of daily living:  No assistance needed    Home Safety:  No safety concerns identified    Hearing Impairment:  No hearing concerns    In the past 6 months, have you been bothered by leaking of urine?  No    In general, how would you rate your overall mental or emotional health?  Excellent      PHQ-2 Total Score: 0    Additional concerns today:  No      Have you ever done Advance Care Planning? (For example, a Health Directive, POLST, or a discussion with a medical provider or your loved ones about your wishes): Yes, advance care planning is on file.       Fall risk  Fallen 2 or more times in the past year?: No  Any fall with injury in the past year?: No    Cognitive Screening     Do you have sleep apnea, excessive snoring or daytime drowsiness?: no    Reviewed and updated as needed this visit by clinical staff    Allergies               Reviewed and updated as needed this visit by Provider                 Social History     Tobacco Use     Smoking status: Never     Smokeless tobacco: Never   Vaping Use     Vaping status: Never Used   Substance Use Topics     Alcohol use: " Yes     Comment: few weekly             5/10/2023     9:00 PM   Alcohol Use   Prescreen: >3 drinks/day or >7 drinks/week? No     Do you have a current opioid prescription? No  Do you use any other controlled substances or medications that are not prescribed by a provider? None          PROBLEMS TO ADD ON...      Insomnia, unspecified type  Acquired hypothyroidism  Screening for prostate cancer  Screen for colon cancer  Chronic pain of both shoulders  Hyperlipidemia LDL goal <130     Current providers sharing in care for this patient include:   Patient Care Team:  Alexa Clemons MD as PCP - General (Family Practice)  Alexa Clemons MD as Assigned PCP    The following health maintenance items are reviewed in Epic and correct as of today:  Health Maintenance   Topic Date Due     HEPATITIS C SCREENING  Never done     AORTIC ANEURYSM SCREENING (SYSTEM ASSIGNED)  Never done     MEDICARE ANNUAL WELLNESS VISIT  11/30/2022     TSH W/FREE T4 REFLEX  11/30/2022     ANNUAL REVIEW OF HM ORDERS  11/30/2022     COVID-19 Vaccine (6 - Pfizer series) 01/28/2023     Pneumococcal Vaccine: 65+ Years (2 - PCV) 12/01/2022     FALL RISK ASSESSMENT  05/16/2024     DTAP/TDAP/TD IMMUNIZATION (3 - Td or Tdap) 07/19/2025     LIPID  11/30/2026     ADVANCE CARE PLANNING  11/30/2026     COLORECTAL CANCER SCREENING  03/18/2032     PHQ-2 (once per calendar year)  Completed     INFLUENZA VACCINE  Completed     ZOSTER IMMUNIZATION  Completed     IPV IMMUNIZATION  Aged Out     MENINGITIS IMMUNIZATION  Aged Out     Labs reviewed in EPIC          Review of Systems   Constitutional: Negative for chills and fever.   HENT: Negative for congestion, ear pain, hearing loss and sore throat.    Eyes: Negative for pain and visual disturbance.   Respiratory: Negative for cough and shortness of breath.    Cardiovascular: Negative for chest pain, palpitations and peripheral edema.   Gastrointestinal: Negative for abdominal pain, constipation, diarrhea,  "heartburn, hematochezia and nausea.   Genitourinary: Negative for dysuria, frequency, genital sores, hematuria, impotence, penile discharge and urgency.   Musculoskeletal: Positive for arthralgias. Negative for joint swelling and myalgias.   Skin: Negative for rash.   Neurological: Negative for dizziness, weakness, headaches and paresthesias.   Psychiatric/Behavioral: Negative for mood changes. The patient is not nervous/anxious.          OBJECTIVE:   /70   Pulse 70   Temp 98.1  F (36.7  C) (Temporal)   Ht 1.71 m (5' 7.32\")   Wt 65 kg (143 lb 6.4 oz)   SpO2 98%   BMI 22.24 kg/m   Estimated body mass index is 22.24 kg/m  as calculated from the following:    Height as of this encounter: 1.71 m (5' 7.32\").    Weight as of this encounter: 65 kg (143 lb 6.4 oz).  Physical Exam  GENERAL: Healthy, alert and no distress  EYES: Eyes grossly normal to inspection, conjunctivae and sclerae normal  RESP: Lungs clear to auscultation - no rales, rhonchi or wheezes  CV: Regular rate and rhythm, normal S1 S2, no murmur  MS: No gross musculoskeletal defects noted, no edema  NEURO: Normal strength and tone, mentation intact and speech normal  PSYCH: Mentation appears normal, affect normal/bright     Diagnostic Test Results:  Labs reviewed in Epic  Results for orders placed or performed in visit on 05/16/23   PSA, screen     Status: Normal   Result Value Ref Range    Prostate Specific Antigen Screen 1.35 0.00 - 4.00 ug/L   TSH with free T4 reflex     Status: Normal   Result Value Ref Range    TSH 0.92 0.40 - 4.00 mU/L   Lipid panel reflex to direct LDL Non-fasting     Status: None   Result Value Ref Range    Cholesterol 196 <200 mg/dL    Triglycerides 62 <150 mg/dL    Direct Measure  >=40 mg/dL    LDL Cholesterol Calculated 79 <=100 mg/dL    Non HDL Cholesterol 91 <130 mg/dL    Patient Fasting > 8hrs? Unknown     Narrative    Cholesterol  Desirable:  <200 mg/dL    Triglycerides  Normal:  Less than 150 " mg/dL  Borderline High:  150-199 mg/dL  High:  200-499 mg/dL  Very High:  Greater than or equal to 500 mg/dL    Direct Measure HDL  Female:  Greater than or equal to 50 mg/dL   Male:  Greater than or equal to 40 mg/dL    LDL Cholesterol  Desirable:  <100mg/dL  Above Desirable:  100-129 mg/dL   Borderline High:  130-159 mg/dL   High:  160-189 mg/dL   Very High:  >= 190 mg/dL    Non HDL Cholesterol  Desirable:  130 mg/dL  Above Desirable:  130-159 mg/dL  Borderline High:  160-189 mg/dL  High:  190-219 mg/dL  Very High:  Greater than or equal to 220 mg/dL       ASSESSMENT / PLAN:   (Z00.00) Medicare annual wellness visit, subsequent  (primary encounter diagnosis)  Comment:  Reviewed the need for periodic healthcare exams and screenings.   Plan: REVIEW OF HEALTH MAINTENANCE PROTOCOL ORDERS        Advised yearly check ups.     (G47.00) Insomnia, unspecified type  Comment: continue to use as needed.   Plan: hydrOXYzine (ATARAX) 25 MG tablet            (E03.9) Acquired hypothyroidism  Comment: Clinically euthyroid, TSH therapeutic.  Plan: TSH with free T4 reflex       Continue current dose.    (Z12.5) Screening for prostate cancer  Comment: Normal.  Plan: PSA, screen        Continue screening until age 70.      (M25.511,  G89.29,  M25.512) Chronic pain of both shoulders  Comment: Probable osteoarthritis, some benefit from muscle relaxer.  Plan: cyclobenzaprine (FLEXERIL) 5 MG tablet        Refill given, reviewed side effects.    (E78.5) Hyperlipidemia LDL goal <130  Comment: Tolerating statin therapy well.  Plan: Lipid panel reflex to direct LDL Non-fasting        Anemia.    Patient Instructions   Blood tests today.     Good luck with work/life balance.     Refills on your medications.     Refill on the cyclobenzaprine.     Yearly dermatology visit. Check your provider network.     Yearly physical.              Patient has been advised of split billing requirements and indicates understanding:  Yes      COUNSELING:  Reviewed preventive health counseling, as reflected in patient instructions       Regular exercise       Healthy diet/nutrition       Vision screening       Hearing screening       Dental care       Bladder control       Fall risk prevention       Alcohol Use        Colon cancer screening       Prostate cancer screening        He reports that he has never smoked. He has never used smokeless tobacco.      Appropriate preventive services were discussed with this patient, including applicable screening as appropriate for cardiovascular disease, diabetes, osteopenia/osteoporosis, and glaucoma.  As appropriate for age/gender, discussed screening for colorectal cancer, prostate cancer, breast cancer, and cervical cancer. Checklist reviewing preventive services available has been given to the patient.    Reviewed patients plan of care and provided an AVS. The Intermediate Care Plan ( asthma action plan, low back pain action plan, and migraine action plan) for Jet meets the Care Plan requirement. This Care Plan has been established and reviewed with the Patient.    Alexa Clemons MD  [facility]    Identified Health Risks:

## 2023-05-17 ENCOUNTER — DOCUMENTATION ONLY (OUTPATIENT)
Dept: LAB | Facility: CLINIC | Age: 68
End: 2023-05-17
Payer: COMMERCIAL

## 2023-05-17 LAB
PSA SERPL-MCNC: 1.35 UG/L (ref 0–4)
TSH SERPL DL<=0.005 MIU/L-ACNC: 0.92 MU/L (ref 0.4–4)

## 2023-05-17 NOTE — PROGRESS NOTES
Hi  Your patient Jet stated he needed a lipid panel also. Please sign order if needed.    Thank you

## 2023-05-18 LAB
CHOLEST SERPL-MCNC: 196 MG/DL
FASTING STATUS PATIENT QL REPORTED: NORMAL
HDLC SERPL-MCNC: 105 MG/DL
LDLC SERPL CALC-MCNC: 79 MG/DL
NONHDLC SERPL-MCNC: 91 MG/DL
TRIGL SERPL-MCNC: 62 MG/DL

## 2023-09-06 ENCOUNTER — TRANSFERRED RECORDS (OUTPATIENT)
Dept: HEALTH INFORMATION MANAGEMENT | Facility: CLINIC | Age: 68
End: 2023-09-06
Payer: COMMERCIAL

## 2023-10-23 ENCOUNTER — OFFICE VISIT (OUTPATIENT)
Dept: FAMILY MEDICINE | Facility: CLINIC | Age: 68
End: 2023-10-23
Payer: COMMERCIAL

## 2023-10-23 VITALS
HEART RATE: 61 BPM | WEIGHT: 146.4 LBS | DIASTOLIC BLOOD PRESSURE: 78 MMHG | RESPIRATION RATE: 16 BRPM | BODY MASS INDEX: 22.19 KG/M2 | TEMPERATURE: 96.9 F | HEIGHT: 68 IN | SYSTOLIC BLOOD PRESSURE: 138 MMHG | OXYGEN SATURATION: 99 %

## 2023-10-23 DIAGNOSIS — Z01.818 PREOP GENERAL PHYSICAL EXAM: Primary | ICD-10-CM

## 2023-10-23 DIAGNOSIS — M25.512 CHRONIC LEFT SHOULDER PAIN: ICD-10-CM

## 2023-10-23 DIAGNOSIS — M19.012 PRIMARY OSTEOARTHRITIS OF LEFT SHOULDER: ICD-10-CM

## 2023-10-23 DIAGNOSIS — E03.9 ACQUIRED HYPOTHYROIDISM: Chronic | ICD-10-CM

## 2023-10-23 DIAGNOSIS — E78.5 HYPERLIPIDEMIA LDL GOAL <130: Chronic | ICD-10-CM

## 2023-10-23 DIAGNOSIS — G89.29 CHRONIC LEFT SHOULDER PAIN: ICD-10-CM

## 2023-10-23 LAB
ERYTHROCYTE [DISTWIDTH] IN BLOOD BY AUTOMATED COUNT: 12.9 % (ref 10–15)
HCT VFR BLD AUTO: 41.5 % (ref 40–53)
HGB BLD-MCNC: 13.9 G/DL (ref 13.3–17.7)
MCH RBC QN AUTO: 32.5 PG (ref 26.5–33)
MCHC RBC AUTO-ENTMCNC: 33.5 G/DL (ref 31.5–36.5)
MCV RBC AUTO: 97 FL (ref 78–100)
PLATELET # BLD AUTO: 173 10E3/UL (ref 150–450)
RBC # BLD AUTO: 4.28 10E6/UL (ref 4.4–5.9)
TSH SERPL DL<=0.005 MIU/L-ACNC: 0.62 UIU/ML (ref 0.3–4.2)
WBC # BLD AUTO: 5.5 10E3/UL (ref 4–11)

## 2023-10-23 PROCEDURE — 36415 COLL VENOUS BLD VENIPUNCTURE: CPT | Performed by: FAMILY MEDICINE

## 2023-10-23 PROCEDURE — 90480 ADMN SARSCOV2 VAC 1/ONLY CMP: CPT | Performed by: FAMILY MEDICINE

## 2023-10-23 PROCEDURE — 91320 SARSCV2 VAC 30MCG TRS-SUC IM: CPT | Performed by: FAMILY MEDICINE

## 2023-10-23 PROCEDURE — 99214 OFFICE O/P EST MOD 30 MIN: CPT | Mod: 25 | Performed by: FAMILY MEDICINE

## 2023-10-23 PROCEDURE — 84443 ASSAY THYROID STIM HORMONE: CPT | Performed by: FAMILY MEDICINE

## 2023-10-23 PROCEDURE — 85027 COMPLETE CBC AUTOMATED: CPT | Performed by: FAMILY MEDICINE

## 2023-10-23 RX ORDER — LEVOTHYROXINE, LIOTHYRONINE 57; 13.5 UG/1; UG/1
90 TABLET ORAL DAILY
COMMUNITY
Start: 2023-08-01

## 2023-10-23 ASSESSMENT — PAIN SCALES - GENERAL: PAINLEVEL: NO PAIN (1)

## 2023-10-23 NOTE — PATIENT INSTRUCTIONS
COVID vaccine today.   Lab work today.   Best of luck with upcoming procedure  Preparing for Your Surgery  Getting started  A nurse will call you to review your health history and instructions. They will give you an arrival time based on your scheduled surgery time. Please be ready to share:  Your doctor's clinic name and phone number  Your medical, surgical, and anesthesia history  A list of allergies and sensitivities  A list of medicines, including herbal treatments and over-the-counter drugs  Whether the patient has a legal guardian (ask how to send us the papers in advance)  Please tell us if you're pregnant--or if there's any chance you might be pregnant. Some surgeries may injure a fetus (unborn baby), so they require a pregnancy test. Surgeries that are safe for a fetus don't always need a test, and you can choose whether to have one.   If you have a child who's having surgery, please ask for a copy of Preparing for Your Child's Surgery.    Preparing for surgery  Within 10 to 30 days of surgery: Have a pre-op exam (sometimes called an H&P, or History and Physical). This can be done at a clinic or pre-operative center.  If you're having a , you may not need this exam. Talk to your care team.  At your pre-op exam, talk to your care team about all medicines you take. If you need to stop any medicines before surgery, ask when to start taking them again.  We do this for your safety. Many medicines can make you bleed too much during surgery. Some change how well surgery (anesthesia) drugs work.  Call your insurance company to let them know you're having surgery. (If you don't have insurance, call [phone].)  Call your clinic if there's any change in your health. This includes signs of a cold or flu (sore throat, runny nose, cough, rash, fever). It also includes a scrape or scratch near the surgery site.  If you have questions on the day of surgery, call your hospital or surgery center.  Eating and  drinking guidelines  For your safety: Unless your surgeon tells you otherwise, follow the guidelines below.  Eat and drink as usual until 8 hours before you arrive for surgery. After that, no food or milk.  Drink clear liquids until 2 hours before you arrive. These are liquids you can see through, like water, Gatorade, and Propel Water. They also include plain black coffee and tea (no cream or milk), candy, and breath mints. You can spit out gum when you arrive.  If you drink alcohol: Stop drinking it the night before surgery.  If your care team tells you to take medicine on the morning of surgery, it's okay to take it with a sip of water.  Preventing infection  Shower or bathe the night before and morning of your surgery. Follow the instructions your clinic gave you. (If no instructions, use regular soap.)  Don't shave or clip hair near your surgery site. We'll remove the hair if needed.  Don't smoke or vape the morning of surgery. You may chew nicotine gum up to 2 hours before surgery. A nicotine patch is okay.  Note: Some surgeries require you to completely quit smoking and nicotine. Check with your surgeon.  Your care team will make every effort to keep you safe from infection. We will:  Clean our hands often with soap and water (or an alcohol-based hand rub).  Clean the skin at your surgery site with a special soap that kills germs.  Give you a special gown to keep you warm. (Cold raises the risk of infection.)  Wear special hair covers, masks, gowns and gloves during surgery.  Give antibiotic medicine, if prescribed. Not all surgeries need antibiotics.  What to bring on the day of surgery  Photo ID and insurance card  Copy of your health care directive, if you have one  Glasses and hearing aids (bring cases)  You can't wear contacts during surgery  Inhaler and eye drops, if you use them (tell us about these when you arrive)  CPAP machine or breathing device, if you use them  A few personal items, if spending  the night  If you have . . .  A pacemaker, ICD (cardiac defibrillator) or other implant: Bring the ID card.  An implanted stimulator: Bring the remote control.  A legal guardian: Bring a copy of the certified (court-stamped) guardianship papers.  Please remove any jewelry, including body piercings. Leave jewelry and other valuables at home.  If you're going home the day of surgery  You must have a responsible adult drive you home. They should stay with you overnight as well.  If you don't have someone to stay with you, and you aren't safe to go home alone, we may keep you overnight. Insurance often won't pay for this.  After surgery  If it's hard to control your pain or you need more pain medicine, please call your surgeon's office.  Questions?   If you have any questions for your care team, list them here: _________________________________________________________________________________________________________________________________________________________________________ ____________________________________ ____________________________________ ____________________________________  For informational purposes only. Not to replace the advice of your health care provider. Copyright   2003, 2019 [facility]. All rights reserved. Clinically reviewed by Cathy García MD. [employer]ART[employer] 712928 - REV 12/22.    How to Take Your Medication Before Surgery    Stop aspirin 1 week prior.   No hydroxyzine day of surgery.   Stop supplements and herbals 14 days prior.

## 2023-10-23 NOTE — PROGRESS NOTES
[facility]  [address] [address]  Phone: [phone]  Primary Provider: Alexa Clemons  Pre-op Performing Provider: DORENE TRIMBLE      PREOPERATIVE EVALUATION:  Today's date: 10/23/2023    Jet is a 68 year old male who presents for a preoperative evaluation.      Surgical Information:  Surgery/Procedure: Left Total shoulder arthroplasty. Possible Right shoulder glenohumeral joint injections.   Surgery Location: [facility] Main OR   Surgeon: Dr. Didier Duron   Surgery Date: 11/10/2023  Time of Surgery: 9:50 AM  Where patient plans to recover: Other: Overnight for observation  Fax number for surgical facility: Note does not need to be faxed, will be available electronically in Epic.    Assessment & Plan     The proposed surgical procedure is considered INTERMEDIATE risk.    Preop general physical exam  Mets>4   No chest pain or shortness of breath at rest or with activity.   - CBC with platelets    Chronic left shoulder pain  Scheduled for above procedure     Primary osteoarthritis of left shoulder    Acquired hypothyroidism  On NP thyroid 90 mg daily. Check TSH level today.   - TSH with free T4 reflex    Hyperlipidemia LDL goal <130  Stable on simvastatin.       Risks and Recommendations:  The patient has the following additional risks and recommendations for perioperative complications:   - Patient reports hypotension after surgeries in the past. Not on anti-hypertensive medications.     Antiplatelet or Anticoagulation Medication Instructions:   - aspirin: Discontinue aspirin 7-10 days prior to procedure to reduce bleeding risk. It should be resumed postoperatively.     Additional Medication Instructions:   - Herbal medications and vitamins: HOLD 14 days prior to surgery.    RECOMMENDATION:  APPROVAL GIVEN to proceed with proposed procedure, without further diagnostic evaluation.    The risks, benefits and treatment options of prescribed medications or other  treatments have been discussed with the patient. The patient verbalized their understanding and should call or follow up if no improvement or if they develop further problems.    Lawrence Lopez, DO      Subjective       HPI related to upcoming procedure:    Left shoulder pain/ arthritis.     Mets>4   No chest pain or shortness of breath at rest or with activity.           10/22/2023     7:15 PM   Preop Questions   1. Have you ever had a heart attack or stroke? No   2. Have you ever had surgery on your heart or blood vessels, such as a stent placement, a coronary artery bypass, or surgery on an artery in your head, neck, heart, or legs? No   3. Do you have chest pain with activity? No   4. Do you have a history of  heart failure? No   5. Do you currently have a cold, bronchitis or symptoms of other infection? No   6. Do you have a cough, shortness of breath, or wheezing? No   7. Do you or anyone in your family have previous history of blood clots? No   8. Do you or does anyone in your family have a serious bleeding problem such as prolonged bleeding following surgeries or cuts? No   9. Have you ever had problems with anemia or been told to take iron pills? No   10. Have you had any abnormal blood loss such as black, tarry or bloody stools? No   11. Have you ever had a blood transfusion? No   12. Are you willing to have a blood transfusion if it is medically needed before, during, or after your surgery? Yes   13. Have you or any of your relatives ever had problems with anesthesia? YES - **low blood pressure after surgeries **   14. Do you have sleep apnea, excessive snoring or daytime drowsiness? No   15. Do you have any artifical heart valves or other implanted medical devices like a pacemaker, defibrillator, or continuous glucose monitor? No   16. Do you have artificial joints? No   17. Are you allergic to latex? No       **Issues with anesthesia and low blood pressure after surgery in the past**    Health Care  Directive:  Patient does not have a Health Care Directive or Living Will: Discussed advance care planning with patient; information given to patient to review.    Preoperative Review of :   reviewed - no record of controlled substances prescribed.      Status of Chronic Conditions:  See problem list for active medical problems.  Problems all longstanding and stable, except as noted/documented.  See ROS for pertinent symptoms related to these conditions.      Hyperlipidemia   Simvastatin 20 mg daily.     Hypothyroidism  NP thyroid 90 mg daily.   TSH 5/16/2023: 0.92.       Review of Systems  Constitutional, neuro, ENT, endocrine, pulmonary, cardiac, gastrointestinal, genitourinary, musculoskeletal, integument and psychiatric systems are negative, except as otherwise noted.    Patient Active Problem List    Diagnosis Date Noted    Insomnia 05/12/2013     Priority: Medium    z24 hour info given 02/13/2012     Priority: Medium     EMERGENCY CARE PLAN  Presenting Problem Signs and Symptoms Treatment Plan    Questions or conerns during clinic hours    I will call the clinic directly     Questions or conerns outside clinic hours    I will call the 24 hour nurse line at [phone]    Patient needs to schedule an appointment    I will call the 24 hour scheduling team at [phone] or clinic directly    Same day treatment     I will call the clinic first, nurse line if after hours, urgent care and express care if needed                                    Indirect inguinal hernia 02/07/2011     Priority: Medium    Hyperlipidemia LDL goal <130 10/31/2010     Priority: Medium    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 One year refill.   Problem list name updated by automated process. Provider to review        Past Medical History:   Diagnosis Date    Syncope and collapse 09/01/2006    one episode of  fainting-Seen in [place]    Thyroid disease 1998     Past Surgical History:   Procedure Laterality Date    COLONOSCOPY  06/07/06    Excessivly long & redundant colon, grossly normal. Internal hemorrhoids.     COLONOSCOPY  6/15/2011    Procedure:COLONOSCOPY; Surgeon:CHRISTOPHER GEORGES; Location:[facility]    COLONOSCOPY N/A 3/18/2022    Procedure: COLONOSCOPY;  Surgeon: Cas Mills DO;  Location: [facility]    ORTHOPEDIC SURGERY  2008    L achilles tendon repair    SOFT TISSUE SURGERY  2000    cyst removed from foot    SOFT TISSUE SURGERY  1985    pilonidal cyst    SURGICAL HISTORY OF -   1981    left knee    SURGICAL HISTORY OF -   2011    hernia repair    SURGICAL HISTORY OF -   2011    hydrocele repair     Current Outpatient Medications   Medication Sig Dispense Refill    ASPIRIN 81 MG OR TABS 1 TABLET DAILY      BENADRYL ALLERGY OR as needed for sleep       cyclobenzaprine (FLEXERIL) 5 MG tablet Take 1 tablet (5 mg) by mouth 3 times daily as needed for muscle spasms 60 tablet 3    Digestive Enzymes CAPS Take by mouth 3 times daily      FISH OIL 1000 MG OR CAPS 1- 2 TABLET DAILY      HERBALS Take by mouth daily thyrosine      hydrOXYzine (ATARAX) 25 MG tablet TAKE 1 TO 2 TABLETS BY MOUTH AT BEDTIME AS NEEDED FOR SLEEPTAKE 1 TO 2 TABLETS BY MOUTH AT BEDTIME AS NEEDED FOR SLEEP 120 tablet 3    MAGNESIUM 500 MG OR TABS 1 TABLET DAILY      MELATONIN PO Take by mouth nightly as needed      MULTIVITAMIN TABS   OR 1 TABLET DAILY      NP THYROID 90 MG tablet Take 90 mg by mouth daily      Nutritional Supplements (ADRENAL COMPLEX PO) Take 1 tablet by mouth daily      Probiotic Product (PROBIOTIC DAILY) CAPS Take 1-2 capsules by mouth daily      sildenafil (VIAGRA) 100 MG tablet Take 0.5-1 tablets ( mg) by mouth daily as needed 30 tablet 1    simvastatin (ZOCOR) 20 MG tablet TAKE 1 TABLET AT BEDTIME 90 tablet 3    vitamin B complex with vitamin C (STRESS TAB) tablet Take 1 tablet by mouth daily         No Known  "Allergies     Social History     Tobacco Use    Smoking status: Never    Smokeless tobacco: Never   Substance Use Topics    Alcohol use: Yes     Comment: few weekly       History   Drug Use No         Objective     /78 (BP Location: Right arm, Patient Position: Sitting, Cuff Size: Adult Regular)   Pulse 61   Temp 96.9  F (36.1  C) (Tympanic)   Resp 16   Ht 1.715 m (5' 7.5\")   Wt 66.4 kg (146 lb 6.4 oz)   SpO2 99%   BMI 22.59 kg/m      Physical Exam    GENERAL APPEARANCE: healthy, alert and no distress     EYES: EOMI,  PERRL     HENT: ear canals and TM's normal and nose and mouth without ulcers or lesions     NECK: no adenopathy, no asymmetry, masses, or scars and thyroid normal to palpation     RESP: lungs clear to auscultation - no rales, rhonchi or wheezes     CV: regular rates and rhythm, normal S1 S2, no S3 or S4 and no murmur, click or rub     ABDOMEN:  soft, nontender, no HSM or masses and bowel sounds normal     MS: extremities normal- no gross deformities noted, no evidence of inflammation in joints, FROM in all extremities.     SKIN: no suspicious lesions or rashes     NEURO: Normal strength and tone, sensory exam grossly normal, mentation intact and speech normal     PSYCH: mentation appears normal. and affect normal/bright     LYMPHATICS: No cervical adenopathy    Recent Labs   Lab Test 11/30/21  0805      POTASSIUM 4.5   CR 0.85        Diagnostics:  Labs pending at this time.  Results will be reviewed when available.   No EKG required, no history of coronary heart disease, significant arrhythmia, peripheral arterial disease or other structural heart disease.     Latest Reference Range & Units 10/23/23 16:14   WBC 4.0 - 11.0 10e3/uL 5.5   Hemoglobin 13.3 - 17.7 g/dL 13.9   Hematocrit 40.0 - 53.0 % 41.5   Platelet Count 150 - 450 10e3/uL 173   RBC Count 4.40 - 5.90 10e6/uL 4.28 (L)   MCV 78 - 100 fL 97   MCH 26.5 - 33.0 pg 32.5   MCHC 31.5 - 36.5 g/dL 33.5   RDW 10.0 - 15.0 % 12.9   (L): " Data is abnormally low     Latest Reference Range & Units 10/23/23 16:14   TSH 0.30 - 4.20 uIU/mL 0.62         Revised Cardiac Risk Index (RCRI):  The patient has the following serious cardiovascular risks for perioperative complications:   - No serious cardiac risks = 0 points     RCRI Interpretation: 0 points: Class I (very low risk - 0.4% complication rate)         Signed Electronically by: Lawrence Lopez DO  Copy of this evaluation report is provided to requesting physician.

## 2023-10-23 NOTE — H&P (VIEW-ONLY)
[facility]  [address] [address]  Phone: [phone]  Primary Provider: Alexa Clemons  Pre-op Performing Provider: DORENE TRIMBLE      PREOPERATIVE EVALUATION:  Today's date: 10/23/2023    Jet is a 68 year old male who presents for a preoperative evaluation.      Surgical Information:  Surgery/Procedure: Left Total shoulder arthroplasty. Possible Right shoulder glenohumeral joint injections.   Surgery Location: [facility] Main OR   Surgeon: Dr. Didier Duron   Surgery Date: 11/10/2023  Time of Surgery: 9:50 AM  Where patient plans to recover: Other: Overnight for observation  Fax number for surgical facility: Note does not need to be faxed, will be available electronically in Epic.    Assessment & Plan     The proposed surgical procedure is considered INTERMEDIATE risk.    Preop general physical exam  Mets>4   No chest pain or shortness of breath at rest or with activity.   - CBC with platelets    Chronic left shoulder pain  Scheduled for above procedure     Primary osteoarthritis of left shoulder    Acquired hypothyroidism  On NP thyroid 90 mg daily. Check TSH level today.   - TSH with free T4 reflex    Hyperlipidemia LDL goal <130  Stable on simvastatin.       Risks and Recommendations:  The patient has the following additional risks and recommendations for perioperative complications:   - Patient reports hypotension after surgeries in the past. Not on anti-hypertensive medications.     Antiplatelet or Anticoagulation Medication Instructions:   - aspirin: Discontinue aspirin 7-10 days prior to procedure to reduce bleeding risk. It should be resumed postoperatively.     Additional Medication Instructions:   - Herbal medications and vitamins: HOLD 14 days prior to surgery.    RECOMMENDATION:  APPROVAL GIVEN to proceed with proposed procedure, without further diagnostic evaluation.    The risks, benefits and treatment options of prescribed medications or other  treatments have been discussed with the patient. The patient verbalized their understanding and should call or follow up if no improvement or if they develop further problems.    Lawrence Lopez, DO      Subjective       HPI related to upcoming procedure:    Left shoulder pain/ arthritis.     Mets>4   No chest pain or shortness of breath at rest or with activity.           10/22/2023     7:15 PM   Preop Questions   1. Have you ever had a heart attack or stroke? No   2. Have you ever had surgery on your heart or blood vessels, such as a stent placement, a coronary artery bypass, or surgery on an artery in your head, neck, heart, or legs? No   3. Do you have chest pain with activity? No   4. Do you have a history of  heart failure? No   5. Do you currently have a cold, bronchitis or symptoms of other infection? No   6. Do you have a cough, shortness of breath, or wheezing? No   7. Do you or anyone in your family have previous history of blood clots? No   8. Do you or does anyone in your family have a serious bleeding problem such as prolonged bleeding following surgeries or cuts? No   9. Have you ever had problems with anemia or been told to take iron pills? No   10. Have you had any abnormal blood loss such as black, tarry or bloody stools? No   11. Have you ever had a blood transfusion? No   12. Are you willing to have a blood transfusion if it is medically needed before, during, or after your surgery? Yes   13. Have you or any of your relatives ever had problems with anesthesia? YES - **low blood pressure after surgeries **   14. Do you have sleep apnea, excessive snoring or daytime drowsiness? No   15. Do you have any artifical heart valves or other implanted medical devices like a pacemaker, defibrillator, or continuous glucose monitor? No   16. Do you have artificial joints? No   17. Are you allergic to latex? No       **Issues with anesthesia and low blood pressure after surgery in the past**    Health Care  Directive:  Patient does not have a Health Care Directive or Living Will: Discussed advance care planning with patient; information given to patient to review.    Preoperative Review of :   reviewed - no record of controlled substances prescribed.      Status of Chronic Conditions:  See problem list for active medical problems.  Problems all longstanding and stable, except as noted/documented.  See ROS for pertinent symptoms related to these conditions.      Hyperlipidemia   Simvastatin 20 mg daily.     Hypothyroidism  NP thyroid 90 mg daily.   TSH 5/16/2023: 0.92.       Review of Systems  Constitutional, neuro, ENT, endocrine, pulmonary, cardiac, gastrointestinal, genitourinary, musculoskeletal, integument and psychiatric systems are negative, except as otherwise noted.    Patient Active Problem List    Diagnosis Date Noted    Insomnia 05/12/2013     Priority: Medium    z24 hour info given 02/13/2012     Priority: Medium     EMERGENCY CARE PLAN  Presenting Problem Signs and Symptoms Treatment Plan    Questions or conerns during clinic hours    I will call the clinic directly     Questions or conerns outside clinic hours    I will call the 24 hour nurse line at [phone]    Patient needs to schedule an appointment    I will call the 24 hour scheduling team at [phone] or clinic directly    Same day treatment     I will call the clinic first, nurse line if after hours, urgent care and express care if needed                                    Indirect inguinal hernia 02/07/2011     Priority: Medium    Hyperlipidemia LDL goal <130 10/31/2010     Priority: Medium    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 One year refill.   Problem list name updated by automated process. Provider to review        Past Medical History:   Diagnosis Date    Syncope and collapse 09/01/2006    one episode of  fainting-Seen in [place]    Thyroid disease 1998     Past Surgical History:   Procedure Laterality Date    COLONOSCOPY  06/07/06    Excessivly long & redundant colon, grossly normal. Internal hemorrhoids.     COLONOSCOPY  6/15/2011    Procedure:COLONOSCOPY; Surgeon:CHRISTOPHER GEORGES; Location:[facility]    COLONOSCOPY N/A 3/18/2022    Procedure: COLONOSCOPY;  Surgeon: Cas Mills DO;  Location: [facility]    ORTHOPEDIC SURGERY  2008    L achilles tendon repair    SOFT TISSUE SURGERY  2000    cyst removed from foot    SOFT TISSUE SURGERY  1985    pilonidal cyst    SURGICAL HISTORY OF -   1981    left knee    SURGICAL HISTORY OF -   2011    hernia repair    SURGICAL HISTORY OF -   2011    hydrocele repair     Current Outpatient Medications   Medication Sig Dispense Refill    ASPIRIN 81 MG OR TABS 1 TABLET DAILY      BENADRYL ALLERGY OR as needed for sleep       cyclobenzaprine (FLEXERIL) 5 MG tablet Take 1 tablet (5 mg) by mouth 3 times daily as needed for muscle spasms 60 tablet 3    Digestive Enzymes CAPS Take by mouth 3 times daily      FISH OIL 1000 MG OR CAPS 1- 2 TABLET DAILY      HERBALS Take by mouth daily thyrosine      hydrOXYzine (ATARAX) 25 MG tablet TAKE 1 TO 2 TABLETS BY MOUTH AT BEDTIME AS NEEDED FOR SLEEPTAKE 1 TO 2 TABLETS BY MOUTH AT BEDTIME AS NEEDED FOR SLEEP 120 tablet 3    MAGNESIUM 500 MG OR TABS 1 TABLET DAILY      MELATONIN PO Take by mouth nightly as needed      MULTIVITAMIN TABS   OR 1 TABLET DAILY      NP THYROID 90 MG tablet Take 90 mg by mouth daily      Nutritional Supplements (ADRENAL COMPLEX PO) Take 1 tablet by mouth daily      Probiotic Product (PROBIOTIC DAILY) CAPS Take 1-2 capsules by mouth daily      sildenafil (VIAGRA) 100 MG tablet Take 0.5-1 tablets ( mg) by mouth daily as needed 30 tablet 1    simvastatin (ZOCOR) 20 MG tablet TAKE 1 TABLET AT BEDTIME 90 tablet 3    vitamin B complex with vitamin C (STRESS TAB) tablet Take 1 tablet by mouth daily         No Known  "Allergies     Social History     Tobacco Use    Smoking status: Never    Smokeless tobacco: Never   Substance Use Topics    Alcohol use: Yes     Comment: few weekly       History   Drug Use No         Objective     /78 (BP Location: Right arm, Patient Position: Sitting, Cuff Size: Adult Regular)   Pulse 61   Temp 96.9  F (36.1  C) (Tympanic)   Resp 16   Ht 1.715 m (5' 7.5\")   Wt 66.4 kg (146 lb 6.4 oz)   SpO2 99%   BMI 22.59 kg/m      Physical Exam    GENERAL APPEARANCE: healthy, alert and no distress     EYES: EOMI,  PERRL     HENT: ear canals and TM's normal and nose and mouth without ulcers or lesions     NECK: no adenopathy, no asymmetry, masses, or scars and thyroid normal to palpation     RESP: lungs clear to auscultation - no rales, rhonchi or wheezes     CV: regular rates and rhythm, normal S1 S2, no S3 or S4 and no murmur, click or rub     ABDOMEN:  soft, nontender, no HSM or masses and bowel sounds normal     MS: extremities normal- no gross deformities noted, no evidence of inflammation in joints, FROM in all extremities.     SKIN: no suspicious lesions or rashes     NEURO: Normal strength and tone, sensory exam grossly normal, mentation intact and speech normal     PSYCH: mentation appears normal. and affect normal/bright     LYMPHATICS: No cervical adenopathy    Recent Labs   Lab Test 11/30/21  0805      POTASSIUM 4.5   CR 0.85        Diagnostics:  Labs pending at this time.  Results will be reviewed when available.   No EKG required, no history of coronary heart disease, significant arrhythmia, peripheral arterial disease or other structural heart disease.     Latest Reference Range & Units 10/23/23 16:14   WBC 4.0 - 11.0 10e3/uL 5.5   Hemoglobin 13.3 - 17.7 g/dL 13.9   Hematocrit 40.0 - 53.0 % 41.5   Platelet Count 150 - 450 10e3/uL 173   RBC Count 4.40 - 5.90 10e6/uL 4.28 (L)   MCV 78 - 100 fL 97   MCH 26.5 - 33.0 pg 32.5   MCHC 31.5 - 36.5 g/dL 33.5   RDW 10.0 - 15.0 % 12.9   (L): " Data is abnormally low     Latest Reference Range & Units 10/23/23 16:14   TSH 0.30 - 4.20 uIU/mL 0.62         Revised Cardiac Risk Index (RCRI):  The patient has the following serious cardiovascular risks for perioperative complications:   - No serious cardiac risks = 0 points     RCRI Interpretation: 0 points: Class I (very low risk - 0.4% complication rate)         Signed Electronically by: Lawrence Lopez DO  Copy of this evaluation report is provided to requesting physician.

## 2023-11-10 ENCOUNTER — APPOINTMENT (OUTPATIENT)
Dept: RADIOLOGY | Facility: CLINIC | Age: 68
End: 2023-11-10
Attending: PHYSICIAN ASSISTANT
Payer: COMMERCIAL

## 2023-11-10 ENCOUNTER — ANESTHESIA EVENT (OUTPATIENT)
Dept: SURGERY | Facility: CLINIC | Age: 68
End: 2023-11-10
Payer: COMMERCIAL

## 2023-11-10 ENCOUNTER — ANESTHESIA (OUTPATIENT)
Dept: SURGERY | Facility: CLINIC | Age: 68
End: 2023-11-10
Payer: COMMERCIAL

## 2023-11-10 ENCOUNTER — HOSPITAL ENCOUNTER (OUTPATIENT)
Facility: CLINIC | Age: 68
Discharge: HOME | End: 2023-11-11
Attending: ORTHOPAEDIC SURGERY | Admitting: ORTHOPAEDIC SURGERY
Payer: COMMERCIAL

## 2023-11-10 DIAGNOSIS — Z96.612 STATUS POST TOTAL REPLACEMENT OF LEFT SHOULDER: Primary | ICD-10-CM

## 2023-11-10 LAB
ABO/RH(D): NORMAL
ANTIBODY SCREEN: NEGATIVE
APTT PPP: 33 SECONDS (ref 22–38)
CREAT SERPL-MCNC: 0.81 MG/DL (ref 0.67–1.17)
EGFRCR SERPLBLD CKD-EPI 2021: >90 ML/MIN/1.73M2
ERYTHROCYTE [DISTWIDTH] IN BLOOD BY AUTOMATED COUNT: 13.3 % (ref 10–15)
HCT VFR BLD AUTO: 36.8 % (ref 40–53)
HGB BLD-MCNC: 12.4 G/DL (ref 13.3–17.7)
INR PPP: 1.13 (ref 0.85–1.15)
MCH RBC QN AUTO: 32.4 PG (ref 26.5–33)
MCHC RBC AUTO-ENTMCNC: 33.7 G/DL (ref 31.5–36.5)
MCV RBC AUTO: 96 FL (ref 78–100)
PLATELET # BLD AUTO: 161 10E3/UL (ref 150–450)
POTASSIUM SERPL-SCNC: 4.3 MMOL/L (ref 3.4–5.3)
RBC # BLD AUTO: 3.83 10E6/UL (ref 4.4–5.9)
SARS-COV-2 RNA RESP QL NAA+PROBE: NEGATIVE
SPECIMEN EXPIRATION DATE: NORMAL
WBC # BLD AUTO: 4.2 10E3/UL (ref 4–11)

## 2023-11-10 PROCEDURE — 360N000077 HC SURGERY LEVEL 4, PER MIN: Performed by: ORTHOPAEDIC SURGERY

## 2023-11-10 PROCEDURE — 250N000011 HC RX IP 250 OP 636: Performed by: PHYSICIAN ASSISTANT

## 2023-11-10 PROCEDURE — 250N000012 HC RX MED GY IP 250 OP 636 PS 637: Performed by: ANESTHESIOLOGY

## 2023-11-10 PROCEDURE — 370N000017 HC ANESTHESIA TECHNICAL FEE, PER MIN: Performed by: ORTHOPAEDIC SURGERY

## 2023-11-10 PROCEDURE — 250N000011 HC RX IP 250 OP 636: Performed by: ANESTHESIOLOGY

## 2023-11-10 PROCEDURE — 87635 SARS-COV-2 COVID-19 AMP PRB: CPT | Performed by: ORTHOPAEDIC SURGERY

## 2023-11-10 PROCEDURE — 258N000003 HC RX IP 258 OP 636: Performed by: PHYSICIAN ASSISTANT

## 2023-11-10 PROCEDURE — 250N000011 HC RX IP 250 OP 636: Mod: JZ | Performed by: ANESTHESIOLOGY

## 2023-11-10 PROCEDURE — 82565 ASSAY OF CREATININE: CPT | Performed by: PHYSICIAN ASSISTANT

## 2023-11-10 PROCEDURE — C1713 ANCHOR/SCREW BN/BN,TIS/BN: HCPCS | Performed by: ORTHOPAEDIC SURGERY

## 2023-11-10 PROCEDURE — 250N000013 HC RX MED GY IP 250 OP 250 PS 637: Performed by: ANESTHESIOLOGY

## 2023-11-10 PROCEDURE — 258N000003 HC RX IP 258 OP 636: Performed by: NURSE ANESTHETIST, CERTIFIED REGISTERED

## 2023-11-10 PROCEDURE — 250N000013 HC RX MED GY IP 250 OP 250 PS 637: Performed by: PHYSICIAN ASSISTANT

## 2023-11-10 PROCEDURE — 85610 PROTHROMBIN TIME: CPT | Performed by: PHYSICIAN ASSISTANT

## 2023-11-10 PROCEDURE — 272N000001 HC OR GENERAL SUPPLY STERILE: Performed by: ORTHOPAEDIC SURGERY

## 2023-11-10 PROCEDURE — 99204 OFFICE O/P NEW MOD 45 MIN: CPT | Performed by: FAMILY MEDICINE

## 2023-11-10 PROCEDURE — 85730 THROMBOPLASTIN TIME PARTIAL: CPT | Performed by: PHYSICIAN ASSISTANT

## 2023-11-10 PROCEDURE — 250N000011 HC RX IP 250 OP 636: Mod: JZ | Performed by: ORTHOPAEDIC SURGERY

## 2023-11-10 PROCEDURE — 85027 COMPLETE CBC AUTOMATED: CPT | Performed by: PHYSICIAN ASSISTANT

## 2023-11-10 PROCEDURE — 36415 COLL VENOUS BLD VENIPUNCTURE: CPT | Performed by: PHYSICIAN ASSISTANT

## 2023-11-10 PROCEDURE — 710N000010 HC RECOVERY PHASE 1, LEVEL 2, PER MIN: Performed by: ORTHOPAEDIC SURGERY

## 2023-11-10 PROCEDURE — 250N000009 HC RX 250: Performed by: ORTHOPAEDIC SURGERY

## 2023-11-10 PROCEDURE — 250N000013 HC RX MED GY IP 250 OP 250 PS 637: Performed by: FAMILY MEDICINE

## 2023-11-10 PROCEDURE — 86901 BLOOD TYPING SEROLOGIC RH(D): CPT | Performed by: PHYSICIAN ASSISTANT

## 2023-11-10 PROCEDURE — 250N000009 HC RX 250: Performed by: NURSE ANESTHETIST, CERTIFIED REGISTERED

## 2023-11-10 PROCEDURE — 84132 ASSAY OF SERUM POTASSIUM: CPT | Performed by: PHYSICIAN ASSISTANT

## 2023-11-10 PROCEDURE — 258N000003 HC RX IP 258 OP 636: Performed by: ANESTHESIOLOGY

## 2023-11-10 PROCEDURE — 999N000065 XR SHOULDER LEFT PORT G/E 2 VIEWS: Mod: LT

## 2023-11-10 PROCEDURE — C1776 JOINT DEVICE (IMPLANTABLE): HCPCS | Performed by: ORTHOPAEDIC SURGERY

## 2023-11-10 PROCEDURE — C9290 INJ, BUPIVACAINE LIPOSOME: HCPCS | Performed by: ANESTHESIOLOGY

## 2023-11-10 PROCEDURE — 999N000141 HC STATISTIC PRE-PROCEDURE NURSING ASSESSMENT: Performed by: ORTHOPAEDIC SURGERY

## 2023-11-10 PROCEDURE — 250N000011 HC RX IP 250 OP 636: Mod: JZ | Performed by: NURSE ANESTHETIST, CERTIFIED REGISTERED

## 2023-11-10 DEVICE — IMPLANTABLE DEVICE
Type: IMPLANTABLE DEVICE | Site: SHOULDER | Status: FUNCTIONAL
Brand: TORNIER FLEX SHOULDER SYSTEM

## 2023-11-10 DEVICE — CORTILOC® PEGGED GLENOID
Type: IMPLANTABLE DEVICE | Site: SHOULDER | Status: FUNCTIONAL
Brand: TORNIER PERFORM® ANATOMIC GLENOID

## 2023-11-10 DEVICE — IMPLANTABLE DEVICE
Type: IMPLANTABLE DEVICE | Site: SHOULDER | Status: FUNCTIONAL
Brand: FLEX SHOULDER SYSTEM

## 2023-11-10 DEVICE — BONE CEMENT SIMPLEX FULL DOSE 6191-1-001: Type: IMPLANTABLE DEVICE | Site: SHOULDER | Status: FUNCTIONAL

## 2023-11-10 RX ORDER — FENTANYL CITRATE 50 UG/ML
25 INJECTION, SOLUTION INTRAMUSCULAR; INTRAVENOUS EVERY 5 MIN PRN
Status: DISCONTINUED | OUTPATIENT
Start: 2023-11-10 | End: 2023-11-10 | Stop reason: HOSPADM

## 2023-11-10 RX ORDER — BUPIVACAINE HYDROCHLORIDE 2.5 MG/ML
INJECTION, SOLUTION INFILTRATION; PERINEURAL PRN
Status: DISCONTINUED | OUTPATIENT
Start: 2023-11-10 | End: 2023-11-10 | Stop reason: HOSPADM

## 2023-11-10 RX ORDER — CEFAZOLIN SODIUM/WATER 2 G/20 ML
2 SYRINGE (ML) INTRAVENOUS SEE ADMIN INSTRUCTIONS
Status: DISCONTINUED | OUTPATIENT
Start: 2023-11-10 | End: 2023-11-10 | Stop reason: HOSPADM

## 2023-11-10 RX ORDER — LIDOCAINE 40 MG/G
CREAM TOPICAL
Status: DISCONTINUED | OUTPATIENT
Start: 2023-11-10 | End: 2023-11-10 | Stop reason: HOSPADM

## 2023-11-10 RX ORDER — SIMVASTATIN 20 MG
20 TABLET ORAL AT BEDTIME
Status: DISCONTINUED | OUTPATIENT
Start: 2023-11-10 | End: 2023-11-11 | Stop reason: HOSPADM

## 2023-11-10 RX ORDER — ASPIRIN 325 MG
325 TABLET, DELAYED RELEASE (ENTERIC COATED) ORAL DAILY
Qty: 30 TABLET | Refills: 0 | Status: SHIPPED | OUTPATIENT
Start: 2023-11-10

## 2023-11-10 RX ORDER — ONDANSETRON 2 MG/ML
4 INJECTION INTRAMUSCULAR; INTRAVENOUS EVERY 30 MIN PRN
Status: DISCONTINUED | OUTPATIENT
Start: 2023-11-10 | End: 2023-11-10 | Stop reason: HOSPADM

## 2023-11-10 RX ORDER — SODIUM CHLORIDE, SODIUM LACTATE, POTASSIUM CHLORIDE, CALCIUM CHLORIDE 600; 310; 30; 20 MG/100ML; MG/100ML; MG/100ML; MG/100ML
INJECTION, SOLUTION INTRAVENOUS CONTINUOUS
Status: DISCONTINUED | OUTPATIENT
Start: 2023-11-10 | End: 2023-11-11 | Stop reason: HOSPADM

## 2023-11-10 RX ORDER — OXYCODONE HYDROCHLORIDE 5 MG/1
10 TABLET ORAL EVERY 4 HOURS PRN
Status: DISCONTINUED | OUTPATIENT
Start: 2023-11-10 | End: 2023-11-11 | Stop reason: HOSPADM

## 2023-11-10 RX ORDER — METHYLPREDNISOLONE ACETATE 40 MG/ML
INJECTION, SUSPENSION INTRA-ARTICULAR; INTRALESIONAL; INTRAMUSCULAR; SOFT TISSUE PRN
Status: DISCONTINUED | OUTPATIENT
Start: 2023-11-10 | End: 2023-11-10 | Stop reason: HOSPADM

## 2023-11-10 RX ORDER — ONDANSETRON 4 MG/1
4 TABLET, ORALLY DISINTEGRATING ORAL EVERY 6 HOURS PRN
Qty: 20 TABLET | Refills: 1 | Status: SHIPPED | OUTPATIENT
Start: 2023-11-10

## 2023-11-10 RX ORDER — FENTANYL CITRATE 50 UG/ML
50 INJECTION, SOLUTION INTRAMUSCULAR; INTRAVENOUS EVERY 5 MIN PRN
Status: DISCONTINUED | OUTPATIENT
Start: 2023-11-10 | End: 2023-11-10 | Stop reason: HOSPADM

## 2023-11-10 RX ORDER — ONDANSETRON 4 MG/1
4 TABLET, ORALLY DISINTEGRATING ORAL EVERY 30 MIN PRN
Status: DISCONTINUED | OUTPATIENT
Start: 2023-11-10 | End: 2023-11-10 | Stop reason: HOSPADM

## 2023-11-10 RX ORDER — NALOXONE HYDROCHLORIDE 0.4 MG/ML
0.2 INJECTION, SOLUTION INTRAMUSCULAR; INTRAVENOUS; SUBCUTANEOUS
Status: DISCONTINUED | OUTPATIENT
Start: 2023-11-10 | End: 2023-11-11 | Stop reason: HOSPADM

## 2023-11-10 RX ORDER — HYDROMORPHONE HCL IN WATER/PF 6 MG/30 ML
0.2 PATIENT CONTROLLED ANALGESIA SYRINGE INTRAVENOUS
Status: DISCONTINUED | OUTPATIENT
Start: 2023-11-10 | End: 2023-11-11 | Stop reason: HOSPADM

## 2023-11-10 RX ORDER — HYDROMORPHONE HCL IN WATER/PF 6 MG/30 ML
0.2 PATIENT CONTROLLED ANALGESIA SYRINGE INTRAVENOUS EVERY 5 MIN PRN
Status: DISCONTINUED | OUTPATIENT
Start: 2023-11-10 | End: 2023-11-10 | Stop reason: HOSPADM

## 2023-11-10 RX ORDER — NALOXONE HYDROCHLORIDE 0.4 MG/ML
0.4 INJECTION, SOLUTION INTRAMUSCULAR; INTRAVENOUS; SUBCUTANEOUS
Status: DISCONTINUED | OUTPATIENT
Start: 2023-11-10 | End: 2023-11-11 | Stop reason: HOSPADM

## 2023-11-10 RX ORDER — APREPITANT 40 MG/1
40 CAPSULE ORAL ONCE
Status: COMPLETED | OUTPATIENT
Start: 2023-11-10 | End: 2023-11-10

## 2023-11-10 RX ORDER — LIDOCAINE HYDROCHLORIDE 10 MG/ML
INJECTION, SOLUTION EPIDURAL; INFILTRATION; INTRACAUDAL; PERINEURAL
Status: DISCONTINUED
Start: 2023-11-10 | End: 2023-11-10 | Stop reason: HOSPADM

## 2023-11-10 RX ORDER — DEXAMETHASONE SODIUM PHOSPHATE 10 MG/ML
INJECTION, SOLUTION INTRAMUSCULAR; INTRAVENOUS PRN
Status: DISCONTINUED | OUTPATIENT
Start: 2023-11-10 | End: 2023-11-10

## 2023-11-10 RX ORDER — ONDANSETRON 2 MG/ML
4 INJECTION INTRAMUSCULAR; INTRAVENOUS EVERY 6 HOURS PRN
Status: DISCONTINUED | OUTPATIENT
Start: 2023-11-10 | End: 2023-11-11 | Stop reason: HOSPADM

## 2023-11-10 RX ORDER — LIDOCAINE 40 MG/G
CREAM TOPICAL
Status: DISCONTINUED | OUTPATIENT
Start: 2023-11-10 | End: 2023-11-11 | Stop reason: HOSPADM

## 2023-11-10 RX ORDER — MAGNESIUM HYDROXIDE 1200 MG/15ML
LIQUID ORAL PRN
Status: DISCONTINUED | OUTPATIENT
Start: 2023-11-10 | End: 2023-11-10 | Stop reason: HOSPADM

## 2023-11-10 RX ORDER — SODIUM CHLORIDE, SODIUM LACTATE, POTASSIUM CHLORIDE, CALCIUM CHLORIDE 600; 310; 30; 20 MG/100ML; MG/100ML; MG/100ML; MG/100ML
INJECTION, SOLUTION INTRAVENOUS CONTINUOUS
Status: DISCONTINUED | OUTPATIENT
Start: 2023-11-10 | End: 2023-11-10 | Stop reason: HOSPADM

## 2023-11-10 RX ORDER — CEFAZOLIN SODIUM 1 G/3ML
1 INJECTION, POWDER, FOR SOLUTION INTRAMUSCULAR; INTRAVENOUS EVERY 8 HOURS
Qty: 10 ML | Refills: 0 | Status: COMPLETED | OUTPATIENT
Start: 2023-11-10 | End: 2023-11-11

## 2023-11-10 RX ORDER — ACETAMINOPHEN 325 MG/1
975 TABLET ORAL ONCE
Status: COMPLETED | OUTPATIENT
Start: 2023-11-10 | End: 2023-11-10

## 2023-11-10 RX ORDER — ACETAMINOPHEN 325 MG/1
650 TABLET ORAL EVERY 4 HOURS PRN
Qty: 100 TABLET | Refills: 0 | Status: SHIPPED | OUTPATIENT
Start: 2023-11-10

## 2023-11-10 RX ORDER — AMOXICILLIN 250 MG
1-2 CAPSULE ORAL 2 TIMES DAILY
Qty: 30 TABLET | Refills: 0 | Status: SHIPPED | OUTPATIENT
Start: 2023-11-10 | End: 2024-09-23

## 2023-11-10 RX ORDER — POLYETHYLENE GLYCOL 3350 17 G/17G
17 POWDER, FOR SOLUTION ORAL DAILY
Status: DISCONTINUED | OUTPATIENT
Start: 2023-11-11 | End: 2023-11-11 | Stop reason: HOSPADM

## 2023-11-10 RX ORDER — PROCHLORPERAZINE MALEATE 5 MG
5 TABLET ORAL EVERY 6 HOURS PRN
Status: DISCONTINUED | OUTPATIENT
Start: 2023-11-10 | End: 2023-11-11 | Stop reason: HOSPADM

## 2023-11-10 RX ORDER — ONDANSETRON 4 MG/1
4 TABLET, ORALLY DISINTEGRATING ORAL EVERY 6 HOURS PRN
Status: DISCONTINUED | OUTPATIENT
Start: 2023-11-10 | End: 2023-11-11 | Stop reason: HOSPADM

## 2023-11-10 RX ORDER — BISACODYL 10 MG
10 SUPPOSITORY, RECTAL RECTAL DAILY PRN
Status: DISCONTINUED | OUTPATIENT
Start: 2023-11-10 | End: 2023-11-11 | Stop reason: HOSPADM

## 2023-11-10 RX ORDER — HYDROXYZINE HYDROCHLORIDE 10 MG/1
10 TABLET, FILM COATED ORAL EVERY 6 HOURS PRN
Qty: 30 TABLET | Refills: 0 | Status: SHIPPED | OUTPATIENT
Start: 2023-11-10 | End: 2024-03-27

## 2023-11-10 RX ORDER — CARBOXYMETHYLCELLULOSE SODIUM 10 MG/ML
1 GEL OPHTHALMIC EVERY MORNING
Status: DISCONTINUED | OUTPATIENT
Start: 2023-11-11 | End: 2023-11-11 | Stop reason: HOSPADM

## 2023-11-10 RX ORDER — ONDANSETRON 2 MG/ML
INJECTION INTRAMUSCULAR; INTRAVENOUS PRN
Status: DISCONTINUED | OUTPATIENT
Start: 2023-11-10 | End: 2023-11-10

## 2023-11-10 RX ORDER — HYDROMORPHONE HYDROCHLORIDE 1 MG/ML
0.5 INJECTION, SOLUTION INTRAMUSCULAR; INTRAVENOUS; SUBCUTANEOUS
Status: DISCONTINUED | OUTPATIENT
Start: 2023-11-10 | End: 2023-11-11 | Stop reason: HOSPADM

## 2023-11-10 RX ORDER — THYROID 30 MG/1
90 TABLET ORAL DAILY
Status: DISCONTINUED | OUTPATIENT
Start: 2023-11-11 | End: 2023-11-11 | Stop reason: HOSPADM

## 2023-11-10 RX ORDER — FENTANYL CITRATE 50 UG/ML
25-100 INJECTION, SOLUTION INTRAMUSCULAR; INTRAVENOUS
Status: DISCONTINUED | OUTPATIENT
Start: 2023-11-10 | End: 2023-11-10 | Stop reason: HOSPADM

## 2023-11-10 RX ORDER — HYDROXYZINE HYDROCHLORIDE 25 MG/1
25 TABLET, FILM COATED ORAL
Status: DISCONTINUED | OUTPATIENT
Start: 2023-11-10 | End: 2023-11-11 | Stop reason: HOSPADM

## 2023-11-10 RX ORDER — BUPIVACAINE HYDROCHLORIDE 2.5 MG/ML
INJECTION, SOLUTION EPIDURAL; INFILTRATION; INTRACAUDAL
Status: DISCONTINUED
Start: 2023-11-10 | End: 2023-11-10 | Stop reason: HOSPADM

## 2023-11-10 RX ORDER — CYCLOBENZAPRINE HCL 5 MG
5 TABLET ORAL 3 TIMES DAILY PRN
Status: DISCONTINUED | OUTPATIENT
Start: 2023-11-10 | End: 2023-11-11 | Stop reason: HOSPADM

## 2023-11-10 RX ORDER — VANCOMYCIN HYDROCHLORIDE 1 G/20ML
INJECTION, POWDER, LYOPHILIZED, FOR SOLUTION INTRAVENOUS PRN
Status: DISCONTINUED | OUTPATIENT
Start: 2023-11-10 | End: 2023-11-10 | Stop reason: HOSPADM

## 2023-11-10 RX ORDER — CEFAZOLIN SODIUM/WATER 2 G/20 ML
2 SYRINGE (ML) INTRAVENOUS
Status: COMPLETED | OUTPATIENT
Start: 2023-11-10 | End: 2023-11-10

## 2023-11-10 RX ORDER — VANCOMYCIN HYDROCHLORIDE 1 G/20ML
1 INJECTION, POWDER, LYOPHILIZED, FOR SOLUTION INTRAVENOUS ONCE
Status: DISCONTINUED | OUTPATIENT
Start: 2023-11-10 | End: 2023-11-10 | Stop reason: HOSPADM

## 2023-11-10 RX ORDER — OXYCODONE HYDROCHLORIDE 5 MG/1
5 TABLET ORAL EVERY 4 HOURS PRN
Status: DISCONTINUED | OUTPATIENT
Start: 2023-11-10 | End: 2023-11-11 | Stop reason: HOSPADM

## 2023-11-10 RX ORDER — TRANEXAMIC ACID 650 MG/1
1950 TABLET ORAL ONCE
Status: COMPLETED | OUTPATIENT
Start: 2023-11-10 | End: 2023-11-10

## 2023-11-10 RX ORDER — ACETAMINOPHEN 325 MG/1
975 TABLET ORAL ONCE
Status: DISCONTINUED | OUTPATIENT
Start: 2023-11-10 | End: 2023-11-10 | Stop reason: HOSPADM

## 2023-11-10 RX ORDER — HYDROMORPHONE HCL IN WATER/PF 6 MG/30 ML
0.4 PATIENT CONTROLLED ANALGESIA SYRINGE INTRAVENOUS EVERY 5 MIN PRN
Status: DISCONTINUED | OUTPATIENT
Start: 2023-11-10 | End: 2023-11-10 | Stop reason: HOSPADM

## 2023-11-10 RX ORDER — BUPIVACAINE HYDROCHLORIDE 2.5 MG/ML
INJECTION, SOLUTION EPIDURAL; INFILTRATION; INTRACAUDAL
Status: DISCONTINUED | OUTPATIENT
Start: 2023-11-10 | End: 2023-11-10

## 2023-11-10 RX ORDER — ACETAMINOPHEN 325 MG/1
975 TABLET ORAL EVERY 8 HOURS
Qty: 27 TABLET | Refills: 0 | Status: DISCONTINUED | OUTPATIENT
Start: 2023-11-10 | End: 2023-11-11 | Stop reason: HOSPADM

## 2023-11-10 RX ORDER — VANCOMYCIN HYDROCHLORIDE 1 G/20ML
INJECTION, POWDER, LYOPHILIZED, FOR SOLUTION INTRAVENOUS
Status: DISCONTINUED
Start: 2023-11-10 | End: 2023-11-10 | Stop reason: HOSPADM

## 2023-11-10 RX ORDER — GLYCOPYRROLATE 0.2 MG/ML
INJECTION, SOLUTION INTRAMUSCULAR; INTRAVENOUS PRN
Status: DISCONTINUED | OUTPATIENT
Start: 2023-11-10 | End: 2023-11-10

## 2023-11-10 RX ORDER — PROPOFOL 10 MG/ML
INJECTION, EMULSION INTRAVENOUS CONTINUOUS PRN
Status: DISCONTINUED | OUTPATIENT
Start: 2023-11-10 | End: 2023-11-10

## 2023-11-10 RX ORDER — AMOXICILLIN 250 MG
1 CAPSULE ORAL 2 TIMES DAILY
Status: DISCONTINUED | OUTPATIENT
Start: 2023-11-10 | End: 2023-11-11 | Stop reason: HOSPADM

## 2023-11-10 RX ORDER — METHYLPREDNISOLONE ACETATE 40 MG/ML
INJECTION, SUSPENSION INTRA-ARTICULAR; INTRALESIONAL; INTRAMUSCULAR; SOFT TISSUE
Status: DISCONTINUED
Start: 2023-11-10 | End: 2023-11-10 | Stop reason: HOSPADM

## 2023-11-10 RX ORDER — HYDROXYZINE HYDROCHLORIDE 10 MG/1
10 TABLET, FILM COATED ORAL EVERY 6 HOURS PRN
Status: DISCONTINUED | OUTPATIENT
Start: 2023-11-10 | End: 2023-11-11 | Stop reason: HOSPADM

## 2023-11-10 RX ORDER — ACETAMINOPHEN 325 MG/1
650 TABLET ORAL EVERY 4 HOURS PRN
Status: DISCONTINUED | OUTPATIENT
Start: 2023-11-13 | End: 2023-11-11 | Stop reason: HOSPADM

## 2023-11-10 RX ORDER — OXYCODONE HYDROCHLORIDE 5 MG/1
5-10 TABLET ORAL EVERY 4 HOURS PRN
Qty: 20 TABLET | Refills: 0 | Status: SHIPPED | OUTPATIENT
Start: 2023-11-10

## 2023-11-10 RX ORDER — ASPIRIN 325 MG
325 TABLET, DELAYED RELEASE (ENTERIC COATED) ORAL DAILY
Status: DISCONTINUED | OUTPATIENT
Start: 2023-11-10 | End: 2023-11-11 | Stop reason: HOSPADM

## 2023-11-10 RX ADMIN — DEXAMETHASONE SODIUM PHOSPHATE 10 MG: 10 INJECTION, SOLUTION INTRAMUSCULAR; INTRAVENOUS at 10:20

## 2023-11-10 RX ADMIN — SODIUM CHLORIDE, POTASSIUM CHLORIDE, SODIUM LACTATE AND CALCIUM CHLORIDE 1000 ML: 600; 310; 30; 20 INJECTION, SOLUTION INTRAVENOUS at 08:17

## 2023-11-10 RX ADMIN — ONDANSETRON 4 MG: 2 INJECTION INTRAMUSCULAR; INTRAVENOUS at 10:41

## 2023-11-10 RX ADMIN — SIMVASTATIN 20 MG: 20 TABLET, FILM COATED ORAL at 20:02

## 2023-11-10 RX ADMIN — BUPIVACAINE 10 ML: 13.3 INJECTION, SUSPENSION, LIPOSOMAL INFILTRATION at 09:04

## 2023-11-10 RX ADMIN — PHENYLEPHRINE HYDROCHLORIDE 100 MCG: 10 INJECTION INTRAVENOUS at 11:45

## 2023-11-10 RX ADMIN — SODIUM CHLORIDE, POTASSIUM CHLORIDE, SODIUM LACTATE AND CALCIUM CHLORIDE: 600; 310; 30; 20 INJECTION, SOLUTION INTRAVENOUS at 10:57

## 2023-11-10 RX ADMIN — Medication 2 G: at 10:12

## 2023-11-10 RX ADMIN — FENTANYL CITRATE 50 MCG: 50 INJECTION INTRAMUSCULAR; INTRAVENOUS at 10:21

## 2023-11-10 RX ADMIN — APREPITANT 40 MG: 40 CAPSULE ORAL at 09:22

## 2023-11-10 RX ADMIN — GLYCOPYRROLATE 0.2 MG: 0.2 INJECTION INTRAMUSCULAR; INTRAVENOUS at 11:16

## 2023-11-10 RX ADMIN — ASPIRIN 325 MG: 325 TABLET, DELAYED RELEASE ORAL at 13:56

## 2023-11-10 RX ADMIN — PHENYLEPHRINE HYDROCHLORIDE 100 MCG: 10 INJECTION INTRAVENOUS at 11:58

## 2023-11-10 RX ADMIN — BUPIVACAINE HYDROCHLORIDE 10 ML: 2.5 INJECTION, SOLUTION EPIDURAL; INFILTRATION; INTRACAUDAL at 09:04

## 2023-11-10 RX ADMIN — CEFAZOLIN 1 G: 1 INJECTION, POWDER, FOR SOLUTION INTRAMUSCULAR; INTRAVENOUS at 17:34

## 2023-11-10 RX ADMIN — ROCURONIUM BROMIDE 60 MG: 10 INJECTION, SOLUTION INTRAVENOUS at 10:20

## 2023-11-10 RX ADMIN — ROCURONIUM BROMIDE 10 MG: 10 INJECTION, SOLUTION INTRAVENOUS at 10:36

## 2023-11-10 RX ADMIN — SENNOSIDES AND DOCUSATE SODIUM 1 TABLET: 8.6; 5 TABLET ORAL at 20:02

## 2023-11-10 RX ADMIN — SUGAMMADEX 200 MG: 100 INJECTION, SOLUTION INTRAVENOUS at 12:11

## 2023-11-10 RX ADMIN — SODIUM CHLORIDE, POTASSIUM CHLORIDE, SODIUM LACTATE AND CALCIUM CHLORIDE: 600; 310; 30; 20 INJECTION, SOLUTION INTRAVENOUS at 20:34

## 2023-11-10 RX ADMIN — ACETAMINOPHEN 975 MG: 325 TABLET ORAL at 16:39

## 2023-11-10 RX ADMIN — MIDAZOLAM HYDROCHLORIDE 1 MG: 1 INJECTION, SOLUTION INTRAMUSCULAR; INTRAVENOUS at 09:04

## 2023-11-10 RX ADMIN — PROPOFOL 150 MCG/KG/MIN: 10 INJECTION, EMULSION INTRAVENOUS at 10:22

## 2023-11-10 RX ADMIN — FENTANYL CITRATE 50 MCG: 50 INJECTION INTRAMUSCULAR; INTRAVENOUS at 09:04

## 2023-11-10 RX ADMIN — PHENYLEPHRINE HYDROCHLORIDE 0.5 MCG/KG/MIN: 10 INJECTION INTRAVENOUS at 10:26

## 2023-11-10 RX ADMIN — ACETAMINOPHEN 975 MG: 325 TABLET ORAL at 08:09

## 2023-11-10 RX ADMIN — FENTANYL CITRATE 50 MCG: 50 INJECTION INTRAMUSCULAR; INTRAVENOUS at 10:19

## 2023-11-10 RX ADMIN — TRANEXAMIC ACID 1950 MG: 650 TABLET ORAL at 08:09

## 2023-11-10 RX ADMIN — SODIUM CHLORIDE, POTASSIUM CHLORIDE, SODIUM LACTATE AND CALCIUM CHLORIDE: 600; 310; 30; 20 INJECTION, SOLUTION INTRAVENOUS at 13:56

## 2023-11-10 ASSESSMENT — ACTIVITIES OF DAILY LIVING (ADL)
ADLS_ACUITY_SCORE: 35
ADLS_ACUITY_SCORE: 20
ADLS_ACUITY_SCORE: 35
ADLS_ACUITY_SCORE: 20
ADLS_ACUITY_SCORE: 35

## 2023-11-10 NOTE — ANESTHESIA POSTPROCEDURE EVALUATION
Patient: Jet Granger    Procedure: Procedure(s):  LEFT TOTAL SHOULDER ARTHROPLASTY  RIGHT SHOULDER GLENOHUMERAL JOINT INJECTION       Anesthesia Type:  General    Note:     Postop Pain Control: Uneventful            Sign Out: Well controlled pain   PONV: No   Neuro/Psych: Uneventful            Sign Out: Acceptable/Baseline neuro status   Airway/Respiratory: Uneventful            Sign Out: Acceptable/Baseline resp. status   CV/Hemodynamics: Uneventful            Sign Out: Acceptable CV status; No obvious hypovolemia; No obvious fluid overload   Other NRE: NONE   DID A NON-ROUTINE EVENT OCCUR? No           Last vitals:  Vitals Value Taken Time   /75 11/10/23 1300   Temp 36.4  C (97.5  F) 11/10/23 1225   Pulse 58 11/10/23 1308   Resp 9 11/10/23 1304   SpO2 100 % 11/10/23 1308   Vitals shown include unfiled device data.    Electronically Signed By: Gloria Paulino MD  November 10, 2023  2:34 PM

## 2023-11-10 NOTE — ANESTHESIA PROCEDURE NOTES
Airway       Patient location during procedure: OR       Procedure Start/Stop Times: 11/10/2023 10:22 AM  Staff -        CRNA: Reji Kendall APRN CRNA       Performed By: CRNA  Consent for Airway        Urgency: elective  Indications and Patient Condition       Indications for airway management: kim-procedural       Induction type:intravenous       Mask difficulty assessment: 1 - vent by mask    Final Airway Details       Final airway type: endotracheal airway       Successful airway: ETT - single  Endotracheal Airway Details        ETT size (mm): 7.5       Cuffed: yes       Cuff volume (mL): 10       Successful intubation technique: direct laryngoscopy       DL Blade Type: Morse 2       Grade View of Cords: 1       Adjucts: stylet       Position: Right       Measured from: lips       Secured at (cm): 22       Bite block used: None    Post intubation assessment        Placement verified by: capnometry, equal breath sounds and chest rise        Number of attempts at approach: 1       Number of other approaches attempted: 0       Secured with: silk tape       Ease of procedure: easy       Dentition: Unchanged       Dental guard used and removed. Dental Guard Type: Proguard Red.    Medication(s) Administered   Medication Administration Time: 11/10/2023 10:22 AM

## 2023-11-10 NOTE — PLAN OF CARE
Patient vital signs are at baseline: Yes  Patient able to ambulate as they were prior to admission or with assist devices provided by therapies during their stay:  Yes  Patient MUST void prior to discharge:  Yes  Patient able to tolerate oral intake:  Yes  Pain has adequate pain control using Oral analgesics:  Yes  Does patient have an identified :  Yes  Has goal D/C date and time been discussed with patient:  Yes  Patient is A/Ox4, VSS on RA. SBA with gait belt when ambulating. Voiding adequately. Dressing is CDI, some numbness reported in patient's LUE. IV infusing LR at 75ml/hr, patent. No new skin issues noted. Patient denying pain during shift.

## 2023-11-10 NOTE — PHARMACY-ADMISSION MEDICATION HISTORY
Pharmacist Admission Medication History    Admission medication history is complete. The information provided in this note is only as accurate as the sources available at the time of the update.    Information Source(s): Patient and CareEverywhere/SureScripts via in-person    Pertinent Information: n/a    Changes made to PTA medication list:  Added: artificial tears  Deleted: None  Changed: updated melatonin, benadryl, fish oil    Medication Affordability:  Not including over the counter (OTC) medications, was there a time in the past 3 months when you did not take your medications as prescribed because of cost?: No    Allergies reviewed with patient and updates made in EHR: yes    Medication History Completed By: Rehana Rao [facility] 11/10/2023 7:56 AM    PTA Med List   Medication Sig Last Dose    ASPIRIN 81 MG OR TABS Take 81 mg by mouth daily 11/5/2023 at am    cyclobenzaprine (FLEXERIL) 5 MG tablet Take 1 tablet (5 mg) by mouth 3 times daily as needed for muscle spasms Unknown at prn    Digestive Enzymes CAPS Take 1 capsule by mouth 3 times daily ~1 week at pm    diphenhydrAMINE (BENADRYL) 25 MG capsule Take 25-50 mg by mouth nightly as needed (sleep) Past Month at hs    FISH OIL 1000 MG OR CAPS Take 2 g by mouth 2 times daily 11/3/2023 at pm    HERBALS Take 1 each by mouth daily thyrosine 11/3/2023 at am    hydrOXYzine (ATARAX) 25 MG tablet TAKE 1 TO 2 TABLETS BY MOUTH AT BEDTIME AS NEEDED FOR SLEEPTAKE 1 TO 2 TABLETS BY MOUTH AT BEDTIME AS NEEDED FOR SLEEP 11/9/2023 at hs    hypromellose (ARTIFICIAL TEARS) 0.5 % SOLN ophthalmic solution Place 1 drop into both eyes every morning 11/10/2023 at am; has with    MAGNESIUM 500 MG OR TABS Take 500 mg by mouth daily ~1 week at am    melatonin 5 MG tablet Take 5 mg by mouth nightly as needed for sleep Unknown at prn PM    MULTIVITAMIN TABS   OR Take 1 tablet by mouth daily 11/3/2023 at am    NP THYROID 90 MG tablet Take 90 mg by mouth daily 11/10/2023 at am     Nutritional Supplements (ADRENAL COMPLEX PO) Take 1 tablet by mouth daily ~1 week at am    Probiotic Product (PROBIOTIC DAILY) CAPS Take 1-2 capsules by mouth daily ~1 week at am    sildenafil (VIAGRA) 100 MG tablet Take 0.5-1 tablets ( mg) by mouth daily as needed Unknown at prn    simvastatin (ZOCOR) 20 MG tablet TAKE 1 TABLET AT BEDTIME 11/9/2023 at hs    vitamin B complex with vitamin C (STRESS TAB) tablet Take 1 tablet by mouth daily ~1 week at am

## 2023-11-10 NOTE — PROGRESS NOTES
Patient vital signs are at baseline: Yes  Patient able to ambulate as they were prior to admission or with assist devices provided by therapies during their stay:  Yes  Patient MUST void prior to discharge:  Yes  voided  Patient able to tolerate oral intake:  Yes  Pain has adequate pain control using Oral analgesics:  Yes  Does patient have an identified :  Yes  Has goal D/C date and time been discussed with patient:  Yes

## 2023-11-10 NOTE — CONSULTS
[facility] MEDICINE CONSULT NOTE   Physician requesting consult: Didier Duron MD    Reason for consult: Postoperative medical management of medical co-morbidities as below    Assessment and Plan    Jet Garnger is a 68 year old male with a history of hypothyroidism, dyslipidemia, shoulder osteoarthritis, underwent left shoulder arthroplasty,  mL.  Left shoulder pain is stable.  Hemodynamically stable postoperatively.    Procedure(s):  LEFT TOTAL SHOULDER ARTHROPLASTY  RIGHT SHOULDER GLENOHUMERAL JOINT INJECTION  Post-operative Day: Day of Surgery    Estimated Blood Loss:  100 mL    Dyslipidemia  Simvastatin 20 mg daily    Hypothyroidism  [place] Thyroid 90 mg daily    Status post left shoulder arthroplasty  Resume routine postoperative care  Physical and Occupational Therapy  Left arm sling in place, nonweightbearing in left upper extremity  Use incentive spirometry frequently   DVT prophylaxis per orthopedics, aspirin 325 mg daily  Pain control with Tylenol 975 mg every 8 hours, 650 mg every 4 hours as needed, oxycodone 5 to 10 mg every 4 hours as needed, IV Dilaudid 0.2 to 0.5 mg every 2 hours as needed      -Reviewed the patient's preoperative H and P and updated missing elements.  -Home medication reconciliation has been reviewed.  Medications have been ordered as noted from the home list and changes are documented above     HISTORY     Jet Granger is a 68 year old male hypothyroidism, dyslipidemia, left shoulder osteoarthritis, underwent left shoulder arthroplasty,  mL.  Left shoulder pain is stable.  Hemodynamically stable postoperatively.  Denies headache, chest pain, breathing difficulty, palpitation, nausea, vomiting, abdominal pain or urinary symptoms.  On simvastatin 20 mg daily for dyslipidemia.  Taking [place] Thyroid 90 mg daily for hypothyroidism.  Does not have history of heart disease, lung disease, diabetes, bleeding or clotting disorder or sleep apnea.   Preop physical is reviewed.  History is provided by the patient.    Past Medical History     Patient Active Problem List    Diagnosis Date Noted    S/P reverse total shoulder arthroplasty, left 11/10/2023     Priority: Medium    Insomnia 05/12/2013     Priority: Medium    z24 hour info given 02/13/2012     Priority: Medium     EMERGENCY CARE PLAN  Presenting Problem Signs and Symptoms Treatment Plan    Questions or conerns during clinic hours    I will call the clinic directly     Questions or conerns outside clinic hours    I will call the 24 hour nurse line at [phone]    Patient needs to schedule an appointment    I will call the 24 hour scheduling team at [phone] or clinic directly    Same day treatment     I will call the clinic first, nurse line if after hours, urgent care and express care if needed                                    Indirect inguinal hernia 02/07/2011     Priority: Medium    Hyperlipidemia LDL goal <130 10/31/2010     Priority: Medium    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 One year refill.   Problem list name updated by automated process. Provider to review          Surgical History   He  has a past surgical history that includes surgical history of -  (01/01/1981); colonoscopy (06/07/2006); Colonoscopy (06/15/2011); Soft tissue surgery (01/01/2000); Soft tissue surgery (01/01/1985); orthopedic surgery (01/01/2008); surgical history of -  (01/01/2011); surgical history of -  (01/01/2011); Colonoscopy (N/A, 03/18/2022); and hernia repair.     Past Surgical History:   Procedure Laterality Date    COLONOSCOPY  06/07/2006    Excessivly long & redundant colon, grossly normal. Internal hemorrhoids.     COLONOSCOPY  06/15/2011    Procedure:COLONOSCOPY; Surgeon:CHRISTOPHER GEORGES; Location:[facility]    COLONOSCOPY N/A 03/18/2022    Procedure: COLONOSCOPY;  Surgeon:  Cas Mills, DO;  Location: [facility]    HERNIA REPAIR      ORTHOPEDIC SURGERY  01/01/2008    L achilles tendon repair    SOFT TISSUE SURGERY  01/01/2000    cyst removed from foot    SOFT TISSUE SURGERY  01/01/1985    pilonidal cyst    SURGICAL HISTORY OF -   01/01/1981    left knee    SURGICAL HISTORY OF -   01/01/2011    hernia repair    SURGICAL HISTORY OF -   01/01/2011    hydrocele repair       Family History    Reviewed, and family history includes C.A.D. (age of onset: 51) in his father; C.A.D. (age of onset: 63) in his paternal grandfather; Cerebrovascular Disease (age of onset: 87) in his mother; Diabetes in his maternal grandmother; Hyperlipidemia in his maternal grandmother; Hypertension in his mother; Thyroid Disease in his mother.     Social History    Reviewed, and he  reports that he has never smoked. He has never used smokeless tobacco. He reports current alcohol use. He reports that he does not use drugs.  Social History     Tobacco Use    Smoking status: Never    Smokeless tobacco: Never   Substance Use Topics    Alcohol use: Yes     Comment: few weekly      Allergies   No Known Allergies    Prior to Admission Medications      Medications Prior to Admission   Medication Sig Dispense Refill Last Dose    ASPIRIN 81 MG OR TABS Take 81 mg by mouth daily   11/5/2023 at am    cyclobenzaprine (FLEXERIL) 5 MG tablet Take 1 tablet (5 mg) by mouth 3 times daily as needed for muscle spasms 60 tablet 3 Unknown at prn    Digestive Enzymes CAPS Take 1 capsule by mouth 3 times daily   ~1 week at pm    diphenhydrAMINE (BENADRYL) 25 MG capsule Take 25-50 mg by mouth nightly as needed (sleep)   Past Month at hs    FISH OIL 1000 MG OR CAPS Take 2 g by mouth 2 times daily   11/3/2023 at pm    HERBALS Take 1 each by mouth daily thyrosine   11/3/2023 at am    hydrOXYzine (ATARAX) 25 MG tablet TAKE 1 TO 2 TABLETS BY MOUTH AT BEDTIME AS NEEDED FOR SLEEPTAKE 1 TO 2 TABLETS BY MOUTH AT BEDTIME AS NEEDED FOR SLEEP  120 tablet 3 11/9/2023 at hs    hypromellose (ARTIFICIAL TEARS) 0.5 % SOLN ophthalmic solution Place 1 drop into both eyes every morning   11/10/2023 at am; has with    MAGNESIUM 500 MG OR TABS Take 500 mg by mouth daily   ~1 week at am    melatonin 5 MG tablet Take 5 mg by mouth nightly as needed for sleep   Unknown at prn PM    MULTIVITAMIN TABS   OR Take 1 tablet by mouth daily   11/3/2023 at am    NP THYROID 90 MG tablet Take 90 mg by mouth daily   11/10/2023 at am    Nutritional Supplements (ADRENAL COMPLEX PO) Take 1 tablet by mouth daily   ~1 week at am    Probiotic Product (PROBIOTIC DAILY) CAPS Take 1-2 capsules by mouth daily   ~1 week at am    sildenafil (VIAGRA) 100 MG tablet Take 0.5-1 tablets ( mg) by mouth daily as needed 30 tablet 1 Unknown at prn    simvastatin (ZOCOR) 20 MG tablet TAKE 1 TABLET AT BEDTIME 90 tablet 3 11/9/2023 at hs    vitamin B complex with vitamin C (STRESS TAB) tablet Take 1 tablet by mouth daily   ~1 week at am       Review of Systems   A 12 point comprehensive review of systems was negative except as noted above.    OBJECTIVE         Physical Exam   Temp:  [97.5  F (36.4  C)] 97.5  F (36.4  C)  Pulse:  [49-66] 49  Resp:  [8-33] 14  BP: (115-144)/(65-83) 120/70  SpO2:  [99 %-100 %] 100 %  Body mass index is 22.71 kg/m .    GENERAL:  Alert, appears comfortable, in no acute distress, appears stated age   HEAD:  Normocephalic, without obvious abnormality, atraumatic   EYES:  PERRL, conjunctiva clear,  EOM's intact   NOSE: Nares normal,  mucosa normal, no drainage   THROAT: Lips, mucosa,  gums normal, mouth moist   NECK: Supple, symmetrical, trachea midline   BACK:   Symmetric, no curvature, ROM normal   LUNGS:   Clear to auscultation bilaterally, no rales, rhonchi, or wheezing, symmetric chest rise on inhalation, respirations unlabored   CHEST WALL:  No tenderness or deformity   HEART:  Regular rate and rhythm, S1 and S2 normal, no murmur    ABDOMEN:   Soft, non-tender,  bowel sounds active , no masses, no organomegaly, no rebound or guarding   EXTREMITIES: Status post left shoulder arthroplasty, left arm sling in place   SKIN: No rashes   NEURO: Alert, oriented x 3    PSYCH: Cooperative, behavior is appropriate          Imaging Reviewed Personally By Myself    Radiology Results:   Left shoulder xray  Total shoulder arthroplasty with expected soft tissue thickening, edema, and scattered foci of gas. Otherwise, negative for postoperative purposes.       Labs Reviewed Personally By Myself   Most Recent 3 CBC's:  Recent Labs   Lab Test 11/10/23  0820 10/23/23  1614   WBC 4.2 5.5   HGB 12.4* 13.9   MCV 96 97    173     Most Recent 3 BMP's:  Recent Labs   Lab Test 11/10/23  0820 11/30/21  0805 03/29/19  0901 10/08/15  0846   NA  --  138 139 137   POTASSIUM 4.3 4.5 4.3 4.0   CHLORIDE  --  106 105 104   CO2  --  30 30 27   BUN  --  12 16 12   CR 0.81 0.85 0.88 0.95   ANIONGAP  --  2* 4 6   KENDRICK  --  9.3 9.1 8.6   GLC  --  92 90 82       Preoperative Labs Reviewed Personally By Myself     Thank you for this consultation.  Appreciate the opportunity to participate in the care of Jet Granger, please feel free to contact us for any questions or concerns.    Total time spent 70 min    Kristi Delcid MD  [facility] Medicine  [facility]  Phone: #[phone]

## 2023-11-10 NOTE — ANESTHESIA PREPROCEDURE EVALUATION
Anesthesia Pre-Procedure Evaluation    Patient: Jet Granger   MRN: [id number] : 1955        Procedure : Procedure(s):  LEFT TOTAL SHOULDER ARTHROPLASTY  POSSIBLE RIGHT SHOULDER GLENOHUMERAL JOINT INJECTION          Past Medical History:   Diagnosis Date    PONV (postoperative nausea and vomiting)     Syncope and collapse 2006    one episode of fainting-Seen in [place]    Thyroid disease       Past Surgical History:   Procedure Laterality Date    COLONOSCOPY  2006    Excessivly long & redundant colon, grossly normal. Internal hemorrhoids.     COLONOSCOPY  06/15/2011    Procedure:COLONOSCOPY; Surgeon:CHRISTOPHER GEORGES; Location:[facility]    COLONOSCOPY N/A 2022    Procedure: COLONOSCOPY;  Surgeon: Cas Mills DO;  Location: [facility]    HERNIA REPAIR      ORTHOPEDIC SURGERY  2008    L achilles tendon repair    SOFT TISSUE SURGERY  2000    cyst removed from foot    SOFT TISSUE SURGERY  1985    pilonidal cyst    SURGICAL HISTORY OF -   1981    left knee    SURGICAL HISTORY OF -   2011    hernia repair    SURGICAL HISTORY OF -   2011    hydrocele repair      No Known Allergies   Social History     Tobacco Use    Smoking status: Never    Smokeless tobacco: Never   Substance Use Topics    Alcohol use: Yes     Comment: few weekly      Wt Readings from Last 1 Encounters:   11/10/23 65.8 kg (145 lb)        Anesthesia Evaluation            ROS/MED HX  ENT/Pulmonary:  - neg pulmonary ROS     Neurologic:  - neg neurologic ROS     Cardiovascular:  - neg cardiovascular ROS   (+) Dyslipidemia - -   -  - -                                      METS/Exercise Tolerance:     Hematologic:  - neg hematologic  ROS     Musculoskeletal:       GI/Hepatic:  - neg GI/hepatic ROS     Renal/Genitourinary:       Endo:     (+)          thyroid problem,            Psychiatric/Substance Use:       Infectious Disease:       Malignancy:       Other:            Physical  "Exam    Airway        Mallampati: II   TM distance: > 3 FB   Neck ROM: full   Mouth opening: > 3 cm    Respiratory Devices and Support         Dental       (+) Minor Abnormalities - some fillings, tiny chips      Cardiovascular          Rhythm and rate: regular and normal     Pulmonary           breath sounds clear to auscultation           OUTSIDE LABS:  CBC:   Lab Results   Component Value Date    WBC 4.2 11/10/2023    WBC 5.5 10/23/2023    HGB 12.4 (L) 11/10/2023    HGB 13.9 10/23/2023    HCT 36.8 (L) 11/10/2023    HCT 41.5 10/23/2023     11/10/2023     10/23/2023     BMP:   Lab Results   Component Value Date     11/30/2021     03/29/2019    POTASSIUM 4.3 11/10/2023    POTASSIUM 4.5 11/30/2021    CHLORIDE 106 11/30/2021    CHLORIDE 105 03/29/2019    CO2 30 11/30/2021    CO2 30 03/29/2019    BUN 12 11/30/2021    BUN 16 03/29/2019    CR 0.81 11/10/2023    CR 0.85 11/30/2021    GLC 92 11/30/2021    GLC 90 03/29/2019     COAGS:   Lab Results   Component Value Date    PTT 33 11/10/2023    INR 1.13 11/10/2023     POC: No results found for: \"BGM\", \"HCG\", \"HCGS\"  HEPATIC:   Lab Results   Component Value Date    ALBUMIN 3.9 11/30/2021    PROTTOTAL 7.3 11/30/2021    ALT 33 11/30/2021    AST 27 11/30/2021    ALKPHOS 41 11/30/2021    BILITOTAL 0.8 11/30/2021     OTHER:   Lab Results   Component Value Date    KENDRICK 9.3 11/30/2021    TSH 0.62 10/23/2023    T4 0.71 (L) 09/17/2020    SED 7 03/04/2010       Anesthesia Plan    ASA Status:  2       Anesthesia Type: General.     - Airway: ETT      Maintenance: TIVA.        Consents    Anesthesia Plan(s) and associated risks, benefits, and realistic alternatives discussed. Questions answered and patient/representative(s) expressed understanding.     - Discussed: Risks, Benefits and Alternatives for BOTH SEDATION and the PROCEDURE were discussed     - Discussed with:  Patient            Postoperative Care    Pain management: Peripheral nerve block (Single " Shot).   PONV prophylaxis: Ondansetron (or other 5HT-3), Dexamethasone or Solumedrol, Aprepitant     Comments:                Gloria Paulino MD

## 2023-11-10 NOTE — ANESTHESIA PROCEDURE NOTES
Brachial plexus Procedure Note    Pre-Procedure   Staff -        Anesthesiologist:  Gloria Paulino MD       Performed By: anesthesiologist       Location: pre-op       Procedure Start/Stop Times: 11/10/2023 9:04 AM and 11/10/2023 9:10 AM       Pre-Anesthestic Checklist: patient identified, IV checked, site marked, risks and benefits discussed, informed consent, monitors and equipment checked, pre-op evaluation, at physician/surgeon's request and post-op pain management  Timeout:       Correct Patient: Yes        Correct Procedure: Yes        Correct Site: Yes        Correct Position: Yes        Correct Laterality: Yes        Site Marked: Yes  Procedure Documentation  Procedure: Brachial plexus       Laterality: left       Patient Position: supine       Skin prep: Chloraprep (interscalene approach).       Needle Type: other       Needle Gauge: 20.        Needle Length (Inches): 4        Ultrasound guided       1. Ultrasound was used to identify targeted nerve, plexus, vascular marker, or fascial plane and place a needle adjacent to it in real-time.       2. Ultrasound was used to visualize the spread of anesthetic in close proximity to the above referenced structure.       3. A permanent image is entered into the patient's record.       4. The visualized anatomic structures appeared normal.       5. There were no apparent abnormal pathologic findings.    Assessment/Narrative         The placement was negative for: blood aspirated and painful injection       Bolus given via needle..        Secured via.        Insertion/Infusion Method: Single Shot       Complications: none    Medication(s) Administered   Bupivacaine 0.25% PF (Infiltration) - Infiltration   10 mL - 11/10/2023 9:04:00 AM  Bupivacaine liposome (Exparel) 1.3% LA inj susp (Infiltration) - Infiltration   10 mL - 11/10/2023 9:04:00 AM  Medication Administration Time: 11/10/2023 9:04 AM      FOR [facility] ([facility]) ONLY:   Pain Team Contact information:  "please page the Pain Team Via [facility]. Search \"Pain\". During daytime hours, please page the attending first. At night please page the resident first.      "

## 2023-11-10 NOTE — OP NOTE
Operative Note    Name:  Jet Granger  :  1955  MRN:  [id number]  Procedure Date:  11/10/2023    Preoperative Diagnosis:  Right and Left Shoulder DJD    Postoperative Diagnosis:  Same with intact RC    Procedures:  1.Left Total Shoulder Arthroplasty  2.Open biceps tenodesis  3.Right shoulder glenohumeral joint injection 80mg depomedrol    Surgeon(s):   Didier Duron M.D.     Assistants:  Maria C Escalona PA-C PA-C assistance was required due to the complexity of the procedure, for patient positioning, instrumentation assistance, soft tissue retraction and patient safety.      Circulator: Kenna Pritchett RN  Relief Circulator: Yocasta Lagunas RN  Relief Scrub: Fred Hernandez  Scrub Person: EDDIE CHIN    Anesthesia:   General and Interscalene block    Estimated Blood Loss: 100 mL    Condition on discharge from OR:  stable    Drains: none     Specimens: None    Tissue Removed, Not Sent: Humeral head    Complications: none     Disposition:  Stable and awake to postanesthesia recovery..      INDICATION FOR OPERATION:  Jet Granger is a 68 year old male with a history of shoulder pain and stiffness, and he  has failed nonsurgical treatment. MRI showed evidence of intact rotator cuff and severe osteoarthritis of the shoulder. Recommended he undergo shoulder arthroplasty. Risks and benefits of the planned procedure as well as details of surgery were discussed with the patient. Consent was obtained.     REPORT OF OPERATION:  The patient was brought to operating room and placed supine on the operating table. An interscalene block was placed by Anesthesia preoperatively and he was given appropriate antibiotic preoperatively. After induction of general anesthesia, he was placed in the beach-chair position on the operating room table. All bony prominences were well padded. The shoulder was prepped and draped in normal sterile fashion. A right shoulder glenohumeral joint injection was performed with 80 mg  depomedrol and 2 ml of !% lidocaine.     A standard anterior deltopectoral incision was utilized. Deep retractors were placed below the clavipectoral fascia and the deltoid. The humeral head was inspected. The rotator cuff was noted to be intact. Biceps was tenodesed to the superior aspect of the pectoralis tendon. Subscapularis was released off the lesser tuberosity and tagged.    The humeral head was exposed and noted to have wear pattern of the anterior and mid humeral head with eburnation and large anterior and inferior humeral osteophytes, which were resected. Humeral head cut was then performed using appropriate instrumentation. The humerus was then reamed and broached, and a trial stem with a head protection plate was placed.    The glenoid was then exposed. The labrum was excised circumferentially.  Mild posterior glenoid wear pattern was noted.  Next, the glenoid was drilled and reamed and prepared for a glenoid implant. This was then impacted with appropriate cement technique, and after cement had cured, the humerus was again exposed.   The head was trialed, and then a humeral implant was impacted into place, and an appropriate humeral head was impacted into place. A final reduction was performed, and the shoulder was copiously irrigated with saline irrigation. All instruments were removed.    Subscapularis was repaired back to the lesser tuberosity with six #2 FiberWire sutures. The rotator interval was closed with two #1 Ethibond sutures.   The incision was again copiously irrigated with saline irrigation and 1 gm of Vancomycin powder was placed in the joint and subdeltoid space prior to closure.The incision was closed in layers with #1 Ethibond closure of the deltopectoral split, 0 Vicryl and 2-0 Vicryl subcutaneous closure, and skin staples. A sterile dressing was placed on the shoulder.     Postoperatively he was placed in a shoulder SlingShot brace and  transferred in stable and awake condition to  Postanesthesia Recovery.   Following surgery he will be maintained in the sling for 4-6 weeks postoperatively, may discontinue abduction pillow at 2 week postoperative and begin PT at approximately 3 to 4 weeks postoperative.     Didier Duron MD  Date: 11/10/2023  Time: 12:10 PM    Implants:  Implant Name Type Inv. Item Serial No.  Lot No. LRB No. Used Action   BONE CEMENT SIMPLEX FULL DOSE 6191-1-001 - [id number] Cement, Bone BONE CEMENT SIMPLEX FULL DOSE 6191-1-001  [employer] [id number] Left 1 Implanted   GLENOID CORTILOC MD 40MM - [id number] Total Joint Component/Insert GLENOID CORTILOC MD 40MM [id number] [employer]  Left 1 Implanted   STEM HUMERAL ANATOMIC STD PTC 4B - [id number] Total Joint Component/Insert STEM HUMERAL ANATOMIC STD PTC 4B [id number] [facility]  Left 1 Implanted   IMP HEAD HUM 20MM 51MM SHOULDER AQLS ASCND [id number] - [id number] Total Joint Component/Insert IMP HEAD HUM 20MM 51MM SHOULDER AQLS ASCND [id number] [facility]  Left 1 Implanted

## 2023-11-11 ENCOUNTER — APPOINTMENT (OUTPATIENT)
Dept: OCCUPATIONAL THERAPY | Facility: CLINIC | Age: 68
End: 2023-11-11
Attending: PHYSICIAN ASSISTANT
Payer: COMMERCIAL

## 2023-11-11 VITALS
TEMPERATURE: 97.8 F | WEIGHT: 145 LBS | HEIGHT: 67 IN | SYSTOLIC BLOOD PRESSURE: 125 MMHG | BODY MASS INDEX: 22.76 KG/M2 | DIASTOLIC BLOOD PRESSURE: 70 MMHG | RESPIRATION RATE: 18 BRPM | OXYGEN SATURATION: 99 % | HEART RATE: 64 BPM

## 2023-11-11 LAB
ANION GAP SERPL CALCULATED.3IONS-SCNC: 6 MMOL/L (ref 7–15)
BUN SERPL-MCNC: 16.4 MG/DL (ref 8–23)
CALCIUM SERPL-MCNC: 8.9 MG/DL (ref 8.8–10.2)
CHLORIDE SERPL-SCNC: 105 MMOL/L (ref 98–107)
CREAT SERPL-MCNC: 0.8 MG/DL (ref 0.67–1.17)
DEPRECATED HCO3 PLAS-SCNC: 26 MMOL/L (ref 22–29)
EGFRCR SERPLBLD CKD-EPI 2021: >90 ML/MIN/1.73M2
ERYTHROCYTE [DISTWIDTH] IN BLOOD BY AUTOMATED COUNT: 13.1 % (ref 10–15)
GLUCOSE BLDC GLUCOMTR-MCNC: 151 MG/DL (ref 70–99)
GLUCOSE SERPL-MCNC: 145 MG/DL (ref 70–99)
HCT VFR BLD AUTO: 34.8 % (ref 40–53)
HGB BLD-MCNC: 11.6 G/DL (ref 13.3–17.7)
MCH RBC QN AUTO: 32.2 PG (ref 26.5–33)
MCHC RBC AUTO-ENTMCNC: 33.3 G/DL (ref 31.5–36.5)
MCV RBC AUTO: 97 FL (ref 78–100)
PLATELET # BLD AUTO: 171 10E3/UL (ref 150–450)
POTASSIUM SERPL-SCNC: 4.1 MMOL/L (ref 3.4–5.3)
RBC # BLD AUTO: 3.6 10E6/UL (ref 4.4–5.9)
SODIUM SERPL-SCNC: 137 MMOL/L (ref 135–145)
WBC # BLD AUTO: 14.2 10E3/UL (ref 4–11)

## 2023-11-11 PROCEDURE — 85027 COMPLETE CBC AUTOMATED: CPT | Performed by: FAMILY MEDICINE

## 2023-11-11 PROCEDURE — 97535 SELF CARE MNGMENT TRAINING: CPT | Mod: GO

## 2023-11-11 PROCEDURE — 250N000013 HC RX MED GY IP 250 OP 250 PS 637: Performed by: FAMILY MEDICINE

## 2023-11-11 PROCEDURE — 250N000013 HC RX MED GY IP 250 OP 250 PS 637: Performed by: PHYSICIAN ASSISTANT

## 2023-11-11 PROCEDURE — 99214 OFFICE O/P EST MOD 30 MIN: CPT | Performed by: FAMILY MEDICINE

## 2023-11-11 PROCEDURE — 250N000011 HC RX IP 250 OP 636: Performed by: PHYSICIAN ASSISTANT

## 2023-11-11 PROCEDURE — 36415 COLL VENOUS BLD VENIPUNCTURE: CPT | Performed by: FAMILY MEDICINE

## 2023-11-11 PROCEDURE — 97165 OT EVAL LOW COMPLEX 30 MIN: CPT | Mod: GO

## 2023-11-11 PROCEDURE — 82962 GLUCOSE BLOOD TEST: CPT

## 2023-11-11 PROCEDURE — 80048 BASIC METABOLIC PNL TOTAL CA: CPT | Performed by: FAMILY MEDICINE

## 2023-11-11 RX ORDER — ASPIRIN 81 MG/1
TABLET ORAL
COMMUNITY
Start: 2023-12-12

## 2023-11-11 RX ADMIN — CARBOXYMETHYLCELLULOSE SODIUM 1 DROP: 10 GEL OPHTHALMIC at 06:32

## 2023-11-11 RX ADMIN — ACETAMINOPHEN 975 MG: 325 TABLET ORAL at 08:24

## 2023-11-11 RX ADMIN — SENNOSIDES AND DOCUSATE SODIUM 1 TABLET: 8.6; 5 TABLET ORAL at 08:27

## 2023-11-11 RX ADMIN — ASPIRIN 325 MG: 325 TABLET, DELAYED RELEASE ORAL at 08:29

## 2023-11-11 RX ADMIN — ACETAMINOPHEN 975 MG: 325 TABLET ORAL at 01:15

## 2023-11-11 RX ADMIN — CEFAZOLIN 1 G: 1 INJECTION, POWDER, FOR SOLUTION INTRAMUSCULAR; INTRAVENOUS at 01:53

## 2023-11-11 ASSESSMENT — ACTIVITIES OF DAILY LIVING (ADL)
IADL_COMMENTS: SPOUSE CAN ASSIST AS NEEDED
ADLS_ACUITY_SCORE: 20

## 2023-11-11 NOTE — PLAN OF CARE
Patient vital signs are at baseline: Yes  Patient able to ambulate as they were prior to admission or with assist devices provided by therapies during their stay:  Yes  Patient MUST void prior to discharge:  Yes  Patient able to tolerate oral intake:  Yes  Pain has adequate pain control using Oral analgesics:  Yes  Does patient have an identified :  Yes  Has goal D/C date and time been discussed with patient:  No,  Reason:  Unknown @ this time    Problem: Shoulder Arthroplasty (Total, Matt, Reverse)  Goal: Acceptable Pain Control  Outcome: Progressing     Problem: Shoulder Arthroplasty (Total, Matt, Reverse)  Goal: Absence of Infection Signs and Symptoms  Outcome: Progressing    A&Ox4. VSS, on RA. Denied any pain. CMS intact besides moderate grasp on left arm and numbness and tingling in left arm in the fingers. Patient states feeling is coming back into shoulder. PIV SL. Dressing to shoulder C/D/I. Utilizing ice to site too. Immobilizer on at all times to left shoulder. Discharge pending.

## 2023-11-11 NOTE — PROGRESS NOTES
11/11/23 0730   Appointment Info   Signing Clinician's Name / Credentials (OT) Sepideh Tam, OTR/L   Quick Adds   Quick Adds Certification   Living Environment   People in Home spouse   Current Living Arrangements house   Home Accessibility stairs to enter home   Number of Stairs, Main Entrance 2   Stair Railings, Main Entrance none   Transportation Anticipated family or friend will provide   Living Environment Comments 2 level home with standard set up (tub/sh)   Self-Care   Usual Activity Tolerance good   Current Activity Tolerance good   Equipment Currently Used at Home none   Instrumental Activities of Daily Living (IADL)   Previous Responsibilities driving;yardwork;housekeeping;finances   IADL Comments spouse can assist as needed   General Information   Onset of Illness/Injury or Date of Surgery 11/10/23   Referring Physician Dr. Didier Duron   Patient/Family Therapy Goal Statement (OT) go home   Additional Occupational Profile Info/Pertinent History of Current Problem Pt is s/p L TSA.   Existing Precautions/Restrictions shoulder;weight bearing   Left Upper Extremity (Weight-bearing Status) non weight-bearing (NWB)   Cognitive Status Examination   Orientation Status orientation to person, place and time   Visual Perception   Visual Impairment/Limitations corrective lenses for reading   Sensory   Sensory Quick Adds sensation intact   Pain Assessment   Patient Currently in Pain Yes, see Vital Sign flowsheet   Posture   Posture not impaired   Range of Motion Comprehensive   General Range of Motion other (see comments)  (R UE WNLs; L UE DNT)   Strength Comprehensive (MMT)   General Manual Muscle Testing (MMT) Assessment other (see comments)   Comment, General Manual Muscle Testing (MMT) Assessment R UE WNLs; L UE DNT   Muscle Tone Assessment   Muscle Tone Quick Adds No deficits were identified   Coordination   Upper Extremity Coordination No deficits were identified   Bed Mobility   Bed Mobility supine-sit    Supine-Sit [place] (Bed Mobility) modified independence   Comment (Bed Mobility) flat surface   Transfers   Transfers sit-stand transfer   Sit-Stand Transfer   Sit-Stand [place] (Transfers) modified independence   Balance   Balance Assessment standing dynamic balance   Standing Balance: Dynamic independent   Activities of Daily Living   BADL Assessment/Intervention upper body dressing;lower body dressing   Upper Body Dressing Assessment/Training   Position (Upper Body Dressing) unsupported standing   Comment, (Upper Body Dressing) good carryover of dressing technique   [place] Level (Upper Body Dressing) minimum assist (75% patient effort)   Lower Body Dressing Assessment/Training   Position (Lower Body Dressing) edge of bed sitting   [place] Level (Lower Body Dressing) modified independence   Clinical Impression   Criteria for Skilled Therapeutic Interventions Met (OT) Yes, treatment indicated   OT Diagnosis decreased fxl ind   Influenced by the following impairments L TSA   OT Problem List-Impairments impacting ADL post-surgical precautions   Assessment of Occupational Performance 1-3 Performance Deficits   Identified Performance Deficits dressing, bilateral tasks   Planned Therapy Interventions (OT) ADL retraining;transfer training;bed mobility training   Clinical Decision Making Complexity (OT) problem focused assessment/low complexity   Risk & Benefits of therapy have been explained evaluation/treatment results reviewed;current/potential barriers reviewed;participants voiced agreement with care plan;participants included;patient   OT Total Evaluation Time   OT Eval, Low Complexity Minutes (34183) 10   Therapy Certification   Medical Diagnosis L TSA   Start of Care Date 11/11/23   Certification date from 11/11/23   Certification date to 12/11/23   OT Goals   Therapy Frequency (OT) One time eval and treatment   OT Predicted Duration/Target Date for Goal Attainment 11/11/23   OT Goals Upper  Body Dressing;Bed Mobility;Transfers   OT: Upper Body Dressing Modified independent;Goal Met   OT: Bed Mobility Modified independent;within precautions;Goal Met   OT: Transfer Modified independent;within precautions;Goal Met   Interventions   Interventions Quick Adds Self-Care/Home Management   Self-Care/Home Management   Self-Care/Home Mgmt/ADL, Compensatory, Meal Prep Minutes (72147) 28   Symptoms Noted During/After Treatment (Meal Preparation/Planning Training) none   Treatment Detail/Skilled Intervention Pt in bed and agreeable to OT session upon arrival. Pt completed bed mob with MOD I from a flat surface, and verbalized understanding of handouts for UE exercises and self care techniques. Pt completed sit <> stand txrs with MOD I, LB dressing with MOD I and UB dressing with MIN A trial 1 and MOD I trial 2 with good carryover of techniques. Pt verbalized understanding of all self care and fxl txr techniques. Pt left seated EOB with call bell in reach.   OT Discharge Planning   OT Plan d/c   OT Discharge Recommendation (DC Rec) home with assist   OT Rationale for DC Rec Pt displays good fxl ability and spouse is able to assist with IADLs as needed.   OT Brief overview of current status MOD I   OT Equipment Needed at Discharge   (none)   Total Session Time   Timed Code Treatment Minutes 28   Total Session Time (sum of timed and untimed services) 38    [facility]  OUTPATIENT OCCUPATIONAL THERAPY  EVALUATION  PLAN OF TREATMENT FOR OUTPATIENT REHABILITATION  (COMPLETE FOR INITIAL CLAIMS ONLY)  Patient's Last Name, First Name, M.I.  YOB: 1955  Jet Granger                          Provider's Name  [facility] Medical Record No.  [id number]                             Onset Date:  11/10/23   Start of Care Date:  11/11/23   Type:     ___PT   _X_OT   ___SLP Medical Diagnosis:  L TSA                    OT Diagnosis:  decreased fxl ind Visits  from SOC:  1     See note for plan of treatment, functional goals and certification details    I CERTIFY THE NEED FOR THESE SERVICES FURNISHED UNDER        THIS PLAN OF TREATMENT AND WHILE UNDER MY CARE     (Physician co-signature of this document indicates review and certification of the therapy plan).

## 2023-11-11 NOTE — PROGRESS NOTES
Occupational Therapy Discharge Summary    Reason for therapy discharge:    All goals and outcomes met, no further needs identified.    Progress towards therapy goal(s). See goals on Care Plan in [facility] electronic health record for goal details.  Goals met    Therapy recommendation(s):    Continued therapy is recommended.  Rationale/Recommendations:  Outpt therapy recommended to promote fxl independence.

## 2023-11-11 NOTE — PLAN OF CARE
Goal Outcome Evaluation:  Patient alert and oriented to rooom 17, call light, rounding, VSS.  Patient reports pain in LUE 2, declined further intervention.  Ice to left shoulder.  Left shoulder in immobilizer on.  Patient's left arm, hand has moderate grasp with +2 radial pulse.  Per patient still has numbness and tingling.  Patient resting in bed.  IV fluids infusing.     Patient vital signs are at baseline: Yes  Patient able to ambulate as they were prior to admission or with assist devices provided by therapies during their stay:  Yes  Patient MUST void prior to discharge:  Yes  Patient able to tolerate oral intake:  Yes  Pain has adequate pain control using Oral analgesics:  Yes  Does patient have an identified :  Yes  Has goal D/C date and time been discussed with patient:  Yes

## 2023-11-11 NOTE — PROGRESS NOTES
[facility] MEDICINE PROGRESS NOTE      Identification/Summary: Jet Granger is a 68 year old male with a past medical history of hypothyroidism, dyslipidemia, shoulder osteoarthritis, underwent left shoulder arthroplasty.  POD 1.  Left shoulder pain is stable.    Assessment and Plan:  Dyslipidemia  Simvastatin 20 mg daily    Hypothyroidism  [place] Thyroid 90 mg daily    Acute blood loss anemia  Hemoglobin 11.6 from 13.9    Status post left shoulder arthroplasty  Resume routine postoperative care  Physical and Occupational Therapy  Left arm sling in place, nonweightbearing in left upper extremity  Use incentive spirometry frequently   DVT prophylaxis per orthopedics, aspirin 325 mg daily X 30 day  Pain control with Tylenol 975 mg every 8 hours, 650 mg every 4 hours as needed, oxycodone 5 to 10 mg every 4 hours as needed    Kristi Delcid MD  [facility]  [facility]  Phone: #[phone]  Securely message with the Vocera Web Console (learn more here)  Text page via [employer] Paging/Directory     Interval History/Subjective:  Resting comfortably.  Left shoulder pain is stable.  Felt lightheaded and dizzy while getting out of bed earlier today.  No further dizziness.  Hemodynamically stable.  No other concern.  Denies headache, chest pain, breathing difficulty, palpitation, nausea, vomiting, abdominal pain or urinary symptoms.    Physical Exam/Objective:  Temp:  [97.5  F (36.4  C)-98.2  F (36.8  C)] 97.8  F (36.6  C)  Pulse:  [49-76] 64  Resp:  [8-26] 18  BP: (113-144)/(55-79) 125/70  SpO2:  [92 %-100 %] 99 %  Body mass index is 22.71 kg/m .    GENERAL:  Alert, appears comfortable, in no acute distress, appears stated age   HEAD:  Normocephalic, without obvious abnormality, atraumatic   EYES:  PERRL, conjunctiva  clear,   EOM's intact   NOSE: Nares normal,  mucosa normal, no drainage   THROAT: Lips, mucosa,  gums normal, mouth moist   NECK: Supple,  "symmetrical, trachea midline   BACK:   Symmetric, no curvature, ROM normal   LUNGS:   Clear to auscultation bilaterally, no rales, rhonchi, or wheezing, symmetric chest rise on inhalation, respirations unlabored   CHEST WALL:  No tenderness or deformity   HEART:  Regular rate and rhythm, S1 and S2 normal, no murmur    ABDOMEN:   Soft, non-tender, bowel sounds active , no masses, no organomegaly, no rebound or guarding   EXTREMITIES: Status post left shoulder arthroplasty, left arm sling in place   SKIN: No exanthems in the visualized areas   NEURO: Alert, oriented x3    PSYCH: Cooperative, behavior is appropriate      Data reviewed today: I personally reviewed all new medications, labs, imaging/diagnostics reports over the past 24 hours. Pertinent findings include:    Imaging:   Left shoulder xray  Total shoulder arthroplasty with expected soft tissue thickening, edema, and scattered foci of gas. Otherwise, negative for postoperative purposes.       Labs:  Most Recent 3 CBC's:  Recent Labs   Lab Test 11/11/23  0619 11/10/23  0820 10/23/23  1614   WBC 14.2* 4.2 5.5   HGB 11.6* 12.4* 13.9   MCV 97 96 97    161 173     Most Recent 3 BMP's:  Recent Labs   Lab Test 11/11/23  0619 11/11/23  0528 11/10/23  0820 11/30/21  0805 03/29/19  0901     --   --  138 139   POTASSIUM 4.1  --  4.3 4.5 4.3   CHLORIDE 105  --   --  106 105   CO2 26  --   --  30 30   BUN 16.4  --   --  12 16   CR 0.80  --  0.81 0.85 0.88   ANIONGAP 6*  --   --  2* 4   KENDRICK 8.9  --   --  9.3 9.1   * 151*  --  92 90       Medications:   Personally Reviewed.  Medications    BUPivacaine liposome (EXPAREL) LA inj was given in the infiltration site to produce post-op analgesia. Duration of action is up to 72 hours. Other \"lalo\" meds should not be given for 96 hours except for lidocaine 4% patch. This is for INFORMATION ONLY.      lactated ringers Stopped (11/11/23 0531)      acetaminophen  975 mg Oral Q8H    aspirin  325 mg Oral Daily    " carboxymethylcellulose PF  1 drop Both Eyes QAM    polyethylene glycol  17 g Oral Daily    senna-docusate  1 tablet Oral BID    simvastatin  20 mg Oral At Bedtime    sodium chloride (PF)  3 mL Intracatheter Q8H    thyroid  90 mg Oral Daily      Total time spent 40 min

## 2023-11-11 NOTE — PROGRESS NOTES
ORTHOPEDIC UPPER EXTREMITY PROGRESS NOTE    ASSESSMENT  POD # 1 s/p  Left TSA    PLAN  Continue sling, Ice, analgesia  Discharge planning for home today if vitals stable    Subjective:  Procedure(s):  LEFT TOTAL SHOULDER ARTHROPLASTY  RIGHT SHOULDER GLENOHUMERAL JOINT INJECTION on 11/10/2023.   1 Day Post-Op    Pain: minimal  Vasovagal episode this am after getting up out of bed with PT    Vital signs in last 24 hours  Temp:  [97.5  F (36.4  C)-98.2  F (36.8  C)] 97.8  F (36.6  C)  Pulse:  [49-76] 64  Resp:  [12-26] 18  BP: (113-144)/(55-79) 125/70  SpO2:  [92 %-100 %] 99 %    EXAM     The patient is awake and alert.  Sling is in place and intact.  Incision is covered.  Dressing is clean and dry.     No swelling or erythema noted      Didier Duron MD  [facility]  Date: 11/11/2023  Time: 9:14 AM

## 2023-11-16 ENCOUNTER — DOCUMENTATION ONLY (OUTPATIENT)
Dept: OTHER | Facility: CLINIC | Age: 68
End: 2023-11-16
Payer: COMMERCIAL

## 2023-11-21 ENCOUNTER — TRANSFERRED RECORDS (OUTPATIENT)
Dept: HEALTH INFORMATION MANAGEMENT | Facility: CLINIC | Age: 68
End: 2023-11-21
Payer: COMMERCIAL

## 2023-11-28 DIAGNOSIS — E78.5 HYPERLIPIDEMIA LDL GOAL <130: Chronic | ICD-10-CM

## 2023-11-29 RX ORDER — SIMVASTATIN 20 MG
TABLET ORAL
Qty: 90 TABLET | Refills: 1 | Status: SHIPPED | OUTPATIENT
Start: 2023-11-29 | End: 2024-05-27

## 2023-12-20 ENCOUNTER — TRANSFERRED RECORDS (OUTPATIENT)
Dept: HEALTH INFORMATION MANAGEMENT | Facility: CLINIC | Age: 68
End: 2023-12-20
Payer: COMMERCIAL

## 2024-01-02 NOTE — PATIENT INSTRUCTIONS
Blood tests today.     Good luck with work/life balance.     Refills on your medications.     Refill on the cyclobenzaprine.     Yearly dermatology visit. Check your provider network.     Yearly physical.            OFFICE VISIT    Patient: Janny Sarkar   : 1960 MRN: [id number]    SUBJECTIVE:  Chief Complaint   Patient presents with    Office Visit     [facility]    Follow-up     Labs and CT     Foot     Right foot and leg swelling x 2 weeks    Other     Parking placard to fill out        Patient has given consent to record this visit for documentation in their clinical record.    A 63 year old female presents for  follow-up of pelvic fracture and evaluation of swelling in the right lower extremity.      HISTORY OF PRESENT ILLNESS:  Historian: Self.    Edema of right lower extremity:  Complains of swelling in right leg, and her calf is painful. She has been elevating her leg but does not help. No history of travel. She has a bone density test pending and an appointment with rheumatology pending. Is planning to buy compression socks. Recently she has not started any new medications.Requested  to refill diclofenac sodium.    Multiple closed fractures of pelvis with disruption of pelvic ring with routine healing/Lumbar radiculopathy:  Is taking Pregabalin (LYRICA) 75 MG capsule daily which helps.On MRI accidently they found two pelvic fractures. She also has chronic pain going down the right leg,  she had an MRI and she also had EMG, EMG does show a Chronic and active right lumbar radiculopathy. Affecting the L5 nerve root.She visited a trauma surgeon last week, he agreed with the treatment plan of Dr. Carrington.Neurosurgeon advised to take rest and avoid aggravating activities.         PAST MEDICAL HISTORY:  Past Medical History:   Diagnosis Date    Aortic insufficiency     Diverticulosis     Essential (primary) hypertension     Hepatitis C     Hepatitis-C     Took treatment and was diagnosed as cured by her gastroenterologist at [place]    HSV (herpes simplex virus) infection     Left bundle branch block     Malignant neoplasm (CMD)     cervical cancer traeted with radiation therapy    Osteoarthritis      Radiculopathy      MEDICATIONS:  Current Outpatient Medications   Medication Sig Dispense Refill    BinaxNOW COVID-19 Ag Home Test Kit TEST AS DIRECTED TODAY      fluconazole (DIFLUCAN) 150 MG tablet TAKE 1 TABLET BY MOUTH 1 TIME FOR 1 DOSE      HYDROcodone-acetaminophen (NORCO)  MG per tablet Take 1 tablet by mouth in the morning and 1 tablet in the evening.      HYDROcodone-acetaminophen (NORCO) 5-325 MG per tablet Take 1 tablet by mouth.      miSOPROStol (CYTOTEC) 200 MCG tablet PLACE 2 TABLET VAGINALLY 4 HOURS PRIOR APPOINTMENT TIME      omeprazole (PrilOSEC) 20 MG capsule every 24 hours.      diclofenac (VOLTAREN) 75 MG EC tablet Take 1 tablet by mouth in the morning and 1 tablet in the evening. 60 tablet 0    cyclobenzaprine (FLEXERIL) 10 MG tablet Take 1 tablet by mouth 3 times daily as needed for Muscle spasms. 42 tablet 0    pregabalin (LYRICA) 75 MG capsule Take 1 capsule by mouth in the morning and 1 capsule in the evening. 60 capsule 0    atorvastatin (LIPITOR) 10 MG tablet TAKE 1 TABLET BY MOUTH EVERY EVENING 90 tablet 3    sacubitril-valsartan (Entresto)  MG per tablet Take 1 tablet by mouth in the morning and 1 tablet in the evening. 60 tablet 3    estradiol (ESTRACE) 0.1 MG/GM vaginal cream Place 1 g vaginally 2 days a week. 42.5 g 0    nystatin (MYCOSTATIN) 870896 UNIT/GM ointment Apply topically 2 times daily. 30 g 3    LORazepam (ATIVAN) 1 MG tablet Take 1 tablet by mouth 2 times daily as needed for Anxiety. 60 tablet 3    metoPROLOL succinate (TOPROL-XL) 25 MG 24 hr tablet Take 0.5 tablets by mouth daily. 45 tablet 1    metFORMIN (GLUCOPHAGE) 500 MG tablet TAKE 1 TABLET BY MOUTH IN THE MORNING AND 1 TABLET IN THE EVENING WITH MEALS 60 tablet 1    traMADol (ULTRAM) 50 MG tablet TAKE 1 TABLET BY MOUTH EVERY 4-6 HOURS      CALCIUM PO Take by mouth daily. Gummie      amoxicillin (AMOXIL) 500 MG capsule TAKE 4 CAPSULES BY MOUTH 1 HOUR BEFORE DENTAL PROCEDURE 4 capsule 3    acetaminophen  (TYLENOL) 500 MG tablet Take 2 tablets by mouth every 8 hours as needed for Pain. 60 tablet 0    Multiple Vitamins-Minerals (PRESERVISION AREDS PO)       vitamin E 1000 units capsule Take 1,000 Units by mouth daily.      VITAMIN D, CHOLECALCIFEROL, PO 2,000 units daily      Multiple Vitamins-Minerals (CENTRUM SILVER PO) Daily       No current facility-administered medications for this visit.     ALLERGIES:  Allergies as of 2023    (No Known Allergies)     FAMILY HISTORY:  Family History   Problem Relation Age of Onset    Bipolar disorder Mother     Seizure Disorder Mother     Hypertension Father     Diabetes Father     Osteoarthritis Father     Clotting Disorder Father         DVT when he was in the hospital with heart issues    Pulmonary embolism Paternal Grandfather          of massive PE     SOCIAL HISTORY:  Social History     Tobacco Use    Smoking status: Never     Passive exposure: Never    Smokeless tobacco: Never   Vaping Use    Vaping Use: never used   Substance Use Topics    Alcohol use: Yes     Comment: social    Drug use: No     Past Surgical HISTORY  Past Surgical History:   Procedure Laterality Date    Carpal tunnel release Bilateral 21, 21    Colonoscopy  2022    Hip arthroplasty Left 10/11/2022    Spine surgery  2019    kyphoplasty S1    Total knee replacement Left        REVIEW OF SYSTEMS:  Constitutional: Negative.  Respiratory: Negative dysphagia.  Cardiovascular: Negative chest pain. + Right pedal edema  Gastrointestinal: Negative hematochezia.  Musculoskeletal: + Swelling in the right lower extremity.   Neurological: + Pain in lower back.    All reviews of systems are negative except as noted in HPI.      OBJECTIVE:  Visit Vitals  /71   Pulse 97   Temp 97.3 °F (36.3 °C)   Resp 18   Ht 5' 6\"   Wt 87 kg (191 lb 12.8 oz)   LMP  (LMP Unknown)   SpO2 95%   BMI 30.96 kg/m²       PHYSICAL EXAM:  Constitutional: Alert, in no acute distress and current vital signs  reviewed. She ambulates when he was a Walker    Head and Face: Atraumatic and normocephalic.   Eyes: No discharge, no eyelid swelling and the sclerae were normal.   ENT: Oropharynx normal. Normal appearing outer ear. Tympanic membranes are bilaterally clear, normal appearing nose and normal lips.   Neck: Normal appearing neck and supple neck.   Pulmonary: Breath sounds clear to auscultation bilaterally, but no respiratory distress and normal respiratory rate and effort.   Cardiovascular: Normal rate, regular rhythm, normal S1, normal S2 and edema was not present in the lower extremities.   Abdomen: Soft and nontender.   Psychiatric: Alert and awake, interactive and mood/affect were appropriate.   Skin, Hair, Nails: Normal skin color and pigmentation.    Musculoskeletal:She does have bilateral lower extremity varicose veins, swelling of the right ankle, no erythema or warmth or tenderness, no palpable cord.  Delmis's sign is negative.    DIAGNOSTIC STUDIES:  LAB RESULTS:  No visits with results within 1 Month(s) from this visit.   Latest known visit with results is:   Telephone on 11/13/2023   Component Date Value Ref Range Status    COLOR, URINALYSIS 11/14/2023 Yellow   Final    APPEARANCE, URINALYSIS 11/14/2023 Clear   Final    GLUCOSE, URINALYSIS 11/14/2023 100 (A)  Negative mg/dL Final    BILIRUBIN, URINALYSIS 11/14/2023 Negative  Negative Final    KETONES, URINALYSIS 11/14/2023 Negative  Negative mg/dL Final    SPECIFIC GRAVITY, URINALYSIS 11/14/2023 1.023  1.005 - 1.030 Final    OCCULT BLOOD, URINALYSIS 11/14/2023 Negative  Negative Final    PH, URINALYSIS 11/14/2023 6.0  5.0 - 7.0 Final    PROTEIN, URINALYSIS 11/14/2023 Trace (A)  Negative mg/dL Final    UROBILINOGEN, URINALYSIS 11/14/2023 0.2  0.2, 1.0 mg/dL Final    NITRITE, URINALYSIS 11/14/2023 Negative  Negative Final    LEUKOCYTE ESTERASE, URINALYSIS 11/14/2023 Large (A)  Negative Final    SQUAMOUS EPITHELIAL, URINALYSIS 11/14/2023 None Seen  None Seen, 1  to 5 /hpf Final    ERYTHROCYTES, URINALYSIS 11/14/2023 6 to 10 (A)  None Seen, 1 to 2 /hpf Final    LEUKOCYTES, URINALYSIS 11/14/2023 >100 (A)  None Seen, 1 to 5 /hpf Final    BACTERIA, URINALYSIS 11/14/2023 Moderate (A)  None Seen /hpf Final    HYALINE CASTS, URINALYSIS 11/14/2023 None Seen  None Seen, 1 to 5 /lpf Final    Urine, Bacterial Culture 11/14/2023 >100,000 CFU/mL Klebsiella pneumoniae (A)   Final       ASSESSMENT AND PLAN:  This 63 year old female presents with :  1. Edema of right lower extremity    2. Multiple closed fractures of pelvis with disruption of pelvic ring with routine healing    3. Lumbar radiculopathy        Orders Placed This Encounter    BinaxNOW COVID-19 Ag Home Test Kit    fluconazole (DIFLUCAN) 150 MG tablet    HYDROcodone-acetaminophen (NORCO)  MG per tablet    HYDROcodone-acetaminophen (NORCO) 5-325 MG per tablet    miSOPROStol (CYTOTEC) 200 MCG tablet    omeprazole (PrilOSEC) 20 MG capsule    US VASC LOWER EXTREMITY VENOUS DUPLEX RIGHT       PLAN:  Edema of right lower extremity:  This is most likely due to chronic venous insufficiency, but there has been an acute change with the right ankle being more swollen than the left.  Discussed that Lyrica may be contributing.  This has been managed by her neurosurgeon Dr. Carrington.  Ordered US VASC LOWER EXTREMITY VENOUS DUPLEX RIGHT; to evaluate for a DVT.    Multiple closed fractures of pelvis with disruption of pelvic ring with routine healing/Lumbar radiculopathy:  We reviewed the MRI report from October. It showed that she did have a laminectomy in L5-S1. It also showed multi levels stenosis of the lumbar spine canal.  Follow-up with  as directed.  Currently, the focus is on the pelvic fractures and getting them to heal prior to any kind of intervention in the lumbar spine.    Follow up as needed.  Refer to orders.  Medical compliance with plan discussed and risks of non-compliance reviewed.  Patient education  completed on disease process, etiology & prognosis.  Proper usage and side effects of medications reviewed & discussed.  Return to clinic as clinically indicated as discussed with the patient who verbalized understanding of the plan and is in agreement with the plan.    I, Leticia Loza, have created a visit summary document based on the audio recording between Dr. Dina Hood MD and this patient for the physician to review, edit as needed, and authenticate.    Creation Date: 1/2/2024      I have reviewed and edited the visit summary above and attest that it is accurate.

## 2024-01-31 ENCOUNTER — TRANSFERRED RECORDS (OUTPATIENT)
Dept: HEALTH INFORMATION MANAGEMENT | Facility: CLINIC | Age: 69
End: 2024-01-31
Payer: COMMERCIAL

## 2024-02-20 ENCOUNTER — IMMUNIZATION (OUTPATIENT)
Dept: FAMILY MEDICINE | Facility: CLINIC | Age: 69
End: 2024-02-20
Payer: COMMERCIAL

## 2024-02-20 DIAGNOSIS — Z23 ENCOUNTER FOR IMMUNIZATION: Primary | ICD-10-CM

## 2024-02-20 PROCEDURE — 99207 PR NO CHARGE NURSE ONLY: CPT

## 2024-02-20 PROCEDURE — 90662 IIV NO PRSV INCREASED AG IM: CPT

## 2024-02-20 PROCEDURE — 90471 IMMUNIZATION ADMIN: CPT

## 2024-02-20 NOTE — PROGRESS NOTES
"Prior to immunization administration, verified patients identity using patient s name and date of birth. Please see Immunization Activity for additional information.     Screening Questionnaire for Adult Immunization    Are you sick today?   {:431735}   Do you have allergies to medications, food, a vaccine component or latex?   {:945812}   Have you ever had a serious reaction after receiving a vaccination?   {:340695}   Do you have a long-term health problem with heart, lung, kidney, or metabolic disease (e.g., diabetes), asthma, a blood disorder, no spleen, complement component deficiency, a cochlear implant, or a spinal fluid leak?  Are you on long-term aspirin therapy?   {:110315}   Do you have cancer, leukemia, HIV/AIDS, or any other immune system problem?   {:841146}   Do you have a parent, brother, or sister with an immune system problem?   {:598169}   In the past 3 months, have you taken medications that affect  your immune system, such as prednisone, other steroids, or anticancer drugs; drugs for the treatment of rheumatoid arthritis, Crohn s disease, or psoriasis; or have you had radiation treatments?   {:521771}   Have you had a seizure, or a brain or other nervous system problem?   {:088520}   During the past year, have you received a transfusion of blood or blood    products, or been given immune (gamma) globulin or antiviral drug?   {:831137}   For women: Are you pregnant or is there a chance you could become       pregnant during the next month?   {:548512}   Have you received any vaccinations in the past 4 weeks?   {:624196}     Immunization questionnaire { :292172::\"answers were all negative.\"}    I have reviewed the following standing orders: {Adult Vaccine Standing Order:517941}    Patient instructed to remain in clinic for 15 minutes afterwards, and to report any adverse reactions.     Screening performed by Renetta Padron MA on 2/20/2024 at 1:31 PM.    "

## 2024-03-26 ENCOUNTER — TRANSFERRED RECORDS (OUTPATIENT)
Dept: HEALTH INFORMATION MANAGEMENT | Facility: CLINIC | Age: 69
End: 2024-03-26
Payer: COMMERCIAL

## 2024-03-27 ENCOUNTER — MYC MEDICAL ADVICE (OUTPATIENT)
Dept: FAMILY MEDICINE | Facility: CLINIC | Age: 69
End: 2024-03-27
Payer: COMMERCIAL

## 2024-03-27 DIAGNOSIS — Z96.612 STATUS POST TOTAL REPLACEMENT OF LEFT SHOULDER: ICD-10-CM

## 2024-03-27 RX ORDER — HYDROXYZINE HYDROCHLORIDE 10 MG/1
10 TABLET, FILM COATED ORAL EVERY 6 HOURS PRN
Qty: 30 TABLET | Refills: 0 | Status: SHIPPED | OUTPATIENT
Start: 2024-03-27 | End: 2024-04-25

## 2024-04-25 RX ORDER — HYDROXYZINE HYDROCHLORIDE 25 MG/1
25 TABLET, FILM COATED ORAL EVERY 8 HOURS PRN
Qty: 90 TABLET | Refills: 1 | Status: SHIPPED | OUTPATIENT
Start: 2024-04-25 | End: 2024-08-28

## 2024-05-07 ENCOUNTER — TRANSFERRED RECORDS (OUTPATIENT)
Dept: HEALTH INFORMATION MANAGEMENT | Facility: CLINIC | Age: 69
End: 2024-05-07
Payer: COMMERCIAL

## 2024-05-26 DIAGNOSIS — E78.5 HYPERLIPIDEMIA LDL GOAL <130: Chronic | ICD-10-CM

## 2024-05-27 RX ORDER — SIMVASTATIN 20 MG
TABLET ORAL
Qty: 90 TABLET | Refills: 3 | Status: SHIPPED | OUTPATIENT
Start: 2024-05-27

## 2024-06-29 ENCOUNTER — HEALTH MAINTENANCE LETTER (OUTPATIENT)
Age: 69
End: 2024-06-29

## 2024-08-28 DIAGNOSIS — Z96.612 STATUS POST TOTAL REPLACEMENT OF LEFT SHOULDER: ICD-10-CM

## 2024-08-29 RX ORDER — HYDROXYZINE HYDROCHLORIDE 25 MG/1
25 TABLET, FILM COATED ORAL EVERY 8 HOURS PRN
Qty: 90 TABLET | Refills: 1 | Status: SHIPPED | OUTPATIENT
Start: 2024-08-29

## 2024-09-23 ENCOUNTER — OFFICE VISIT (OUTPATIENT)
Dept: FAMILY MEDICINE | Facility: CLINIC | Age: 69
End: 2024-09-23
Payer: COMMERCIAL

## 2024-09-23 VITALS
DIASTOLIC BLOOD PRESSURE: 81 MMHG | SYSTOLIC BLOOD PRESSURE: 131 MMHG | WEIGHT: 141.1 LBS | BODY MASS INDEX: 22.15 KG/M2 | HEART RATE: 63 BPM | TEMPERATURE: 97.5 F | HEIGHT: 67 IN | RESPIRATION RATE: 16 BRPM | OXYGEN SATURATION: 100 %

## 2024-09-23 DIAGNOSIS — Z71.84 TRAVEL ADVICE ENCOUNTER: Primary | ICD-10-CM

## 2024-09-23 PROCEDURE — 99402 PREV MED CNSL INDIV APPRX 30: CPT | Performed by: NURSE PRACTITIONER

## 2024-09-23 PROCEDURE — 90691 TYPHOID VACCINE IM: CPT | Performed by: NURSE PRACTITIONER

## 2024-09-23 PROCEDURE — 90472 IMMUNIZATION ADMIN EACH ADD: CPT | Performed by: NURSE PRACTITIONER

## 2024-09-23 PROCEDURE — 90471 IMMUNIZATION ADMIN: CPT | Performed by: NURSE PRACTITIONER

## 2024-09-23 PROCEDURE — 90715 TDAP VACCINE 7 YRS/> IM: CPT | Performed by: NURSE PRACTITIONER

## 2024-09-23 RX ORDER — AZITHROMYCIN 500 MG/1
500 TABLET, FILM COATED ORAL DAILY
Qty: 3 TABLET | Refills: 0 | Status: SHIPPED | OUTPATIENT
Start: 2024-09-23

## 2024-09-23 RX ORDER — ATOVAQUONE AND PROGUANIL HYDROCHLORIDE 250; 100 MG/1; MG/1
1 TABLET, FILM COATED ORAL DAILY
Qty: 20 TABLET | Refills: 0 | Status: SHIPPED | OUTPATIENT
Start: 2024-09-23

## 2024-09-23 NOTE — PROGRESS NOTES
"Nurse Note ( Pre-Travel Consult)    Itinerary:  [place], [place], [place], [place]    Departure Date: 12/29/2024    Return Date: 1/19/2025    Length of Trip 3 weeks     Reason for Travel: Tourism  Visiting friends and relatives         Urban or rural: both    Accommodations: Hotel  Family home        IMMUNIZATION HISTORY  Have you received any immunizations within the past 4 weeks?  Yes  Have you ever fainted from having your blood drawn or from an injection?  No  Have you ever had a fever reaction to vaccination?  No  Have you ever had any bad reaction or side effect from any vaccination?  Yes  Have you ever had hepatitis A or B vaccine?  Yes  Do you live (or work closely) with anyone who has AIDS, an AIDS-like condition, any other immune disorder or who is on chemotherapy for cancer?  No  Do you have a family history of immunodeficiency?  No  Have you received any injection of immune globulin or any blood products during the past 12 months?  No    Patient roomed by Maxine Chaudhary VAMSI Granger is a 69 year old maleseen today with spouse for counsultation for international travel.   Patient will be departing in  3 month(s) and  traveling with spouse  with [employer].      Patient itinerary :  will be in the  and [place] prior to [place] > [place] ( [facility])  region of  [facility] which risk for Malaria, Yellow Fever, Rabies, food borne illnesses, and motor vehicle accidents. exposure.      Patient's activities will include sightseeing, safari/game grullon, and travel by car or other vehicle.    Patient's country of birth is [place]  PMH: Joing replacement  Pre-travel questionnaire was completed by patient and reviewed by provider.       Vitals: /81 (BP Location: Left arm, Patient Position: Sitting, Cuff Size: Adult Regular)   Pulse 63   Temp 97.5  F (36.4  C) (Temporal)   Resp 16   Ht 1.702 m (5' 7\")   Wt 64 kg (141 lb 1.6 oz)   SpO2 100%   BMI 22.10 kg/m    BMI= Body mass index is 22.1 " kg/m .    EXAM:  General:  Well-nourished, well-developed in no acute distress.  Appears to be stated age, interacts appropriately and expresses understanding of information given to patient.    Current Outpatient Medications   Medication Sig Dispense Refill    acetaminophen (TYLENOL) 325 MG tablet Take 2 tablets (650 mg) by mouth every 4 hours as needed for other (mild pain) 100 tablet 0    aspirin (ASA) 325 MG EC tablet Take 1 tablet (325 mg) by mouth daily 30 tablet 0    aspirin 81 MG EC tablet To be started in 30 day      atovaquone-proguanil (MALARONE) 250-100 MG tablet Take 1 tablet by mouth daily. Start 2 days before [place]  and continue 7 days after return. 20 tablet 0    azithromycin (ZITHROMAX) 500 MG tablet Take 1 tablet (500 mg) by mouth daily. Take 1 tablet a day for up to 3 days for severe diarrhea 3 tablet 0    cyclobenzaprine (FLEXERIL) 5 MG tablet Take 1 tablet (5 mg) by mouth 3 times daily as needed for muscle spasms 60 tablet 3    Digestive Enzymes CAPS Take 1 capsule by mouth 3 times daily      diphenhydrAMINE (BENADRYL) 25 MG capsule Take 25-50 mg by mouth nightly as needed (sleep)      FISH OIL 1000 MG OR CAPS Take 2 g by mouth 2 times daily      HERBALS Take 1 each by mouth daily thyrosine      hydrOXYzine HCl (ATARAX) 25 MG tablet Take 1 tablet (25 mg) by mouth every 8 hours as needed for anxiety or itching. 90 tablet 1    hypromellose (ARTIFICIAL TEARS) 0.5 % SOLN ophthalmic solution Place 1 drop into both eyes every morning      MAGNESIUM 500 MG OR TABS Take 500 mg by mouth daily      melatonin 5 MG tablet Take 5 mg by mouth nightly as needed for sleep      MULTIVITAMIN TABS   OR Take 1 tablet by mouth daily      NP THYROID 90 MG tablet Take 90 mg by mouth daily      Nutritional Supplements (ADRENAL COMPLEX PO) Take 1 tablet by mouth daily      ondansetron (ZOFRAN ODT) 4 MG ODT tab Take 1 tablet (4 mg) by mouth every 6 hours as needed for nausea Dissolve ON the tongue. 20 tablet 1     oxyCODONE (ROXICODONE) 5 MG tablet Take 1-2 tablets (5-10 mg) by mouth every 4 hours as needed for moderate to severe pain 20 tablet 0    Probiotic Product (PROBIOTIC DAILY) CAPS Take 1-2 capsules by mouth daily      sildenafil (VIAGRA) 100 MG tablet Take 0.5-1 tablets ( mg) by mouth daily as needed 30 tablet 1    simvastatin (ZOCOR) 20 MG tablet TAKE 1 TABLET AT BEDTIME 90 tablet 3    vitamin B complex with vitamin C (STRESS TAB) tablet Take 1 tablet by mouth daily       Patient Active Problem List   Diagnosis    Hypothyroidism    RT Hydrocele of Testis    Hyperlipidemia LDL goal <130    Indirect inguinal hernia    z24 hour info given    Insomnia    S/P reverse total shoulder arthroplasty, left     No Known Allergies      Immunizations discussed include:   Covid 19: Up to date  Hepatitis A:  up to date  Hepatitis B: Declined      Influenza: Up to date  Typhoid: Ordered/given today, risks, benefits and side effects reviewed  Rabies: Declined  reviewed managment of a animal bite or scratch (washing wound, seek medical care within 24 hours for post exposure prophylaxis )  Yellow Fever: Declined  Is concerned about risks of vaccine \ discussed risks and benefits.  Will be in risk area for 4 days  Japanese Encephalitis: Not indicated  Meningococcus: Declined  Not concerned about risk of disease  Tetanus/Diphtheria: Up to date  Measles/Mumps/Rubella: has had for her profession  Cholera: Not needed  Polio: Declined  Not concerned about risk of disease  Pneumococcal: Up to date  Varicella: Immune by disease history per patient report  Shingrix: Up to date  HPV:  Not indicated     TB: low risk     Altitude Exposure on this trip: no  Past tolerance to Altitude: naASSESSMENT/PLAN:  Jet was seen today for travel clinic.    Diagnoses and all orders for this visit:    Travel advice encounter  -     atovaquone-proguanil (MALARONE) 250-100 MG tablet; Take 1 tablet by mouth daily. Start 2 days before [place]  and continue 7  days after return.  -     azithromycin (ZITHROMAX) 500 MG tablet; Take 1 tablet (500 mg) by mouth daily. Take 1 tablet a day for up to 3 days for severe diarrhea    Other orders  -     TYPHOID VACCINE, IM  -     TDAP 7+ (ADACEL,BOOSTRIX)      I have reviewed general recommendations for safe travel   including: food/water precautions, insect precautions, safer sex   practices given high prevalence of Zika, HIV and other STDs,   roadway safety. Educational materials and [employer] report provided.    Malaraia prophylaxis recommended: Malarone  Symptomatic treatment for traveler's diarrhea: azithromycin        Evacuation insurance advised and resources were provided to patient.    Total visit time 30 minutes  with over 50% of time spent counseling patient and shared decision making as detailed above.    Susan De La Cruz CNP  Certificate in Travel Health

## 2024-09-23 NOTE — PATIENT INSTRUCTIONS
Thank you for visiting the [facility] : [phone]  Today September 23, 2024 you received the    Tetanus (Tdap) Vaccine    Typhoid - injectable. This vaccine is valid for two years.     Follow up vaccine appointments can be made as a NURSE ONLY visit at the Travel Clinic, (BE PREPARED TO WAIT, ) or at designated [facility].    If you are receiving the Rabies vaccines series, it is important that you follow the exact schedule ordered.     Pre-travel     We recommend that you purchase Medical Evacuation Insurance prior to your departure.  Https://wwwnc.cdc.gov/travel/page/insurance    [place] your travel plans with the  Department of [employer] through STEP ( Smart Traveler Enrollment Program ) https://step.state.gov.  STEP is a free service to allow [place] citizens and nationals traveling and living abroad to enroll their trip with the nearest U.S. Embassy or Consulate.    Animal Exposure: Avoid all mammals even if they look healthy.  If there is a bite, scratch or even a lick, wash area immediately with soap and water for 15 minutes and seek medical care within 24 hours for evaluation of Rabies post exposure treatment.  Contact your Medical Evacuation Insurance.    Repiratory illness prevention strategies ( Covid and Influenza ) CDC recommendations:  Consider wearing a mask in crowded or poorly ventilated indoor areas, including on public transportation and in transportation hubs.  Hand washing: frequent, thorough handwashing with soap and water for 20 seconds. Use an alcohol-based hand  with at least 60% alcohol if soap and water are not readily available. Avoid touching face, nose, eyes, mouth unless you have done appropriate hand washing as above.  VACCINES: Staying up to date on COVID-19 vaccines significantly lowers the risk of getting very sick, being hospitalized, or dying from COVID-19. CDC recommends that everyone stay up to date on their COVID-19  vaccines, especially people with weakened immune systems.    Travel Covid 19 Testing:  updated 12/06/2021  International travelers: Pre-travel:  See country specific Embassy websites or airline websites.    Post travel: CDC recommends getting tested 3-5 days after your trip     Post-travel illness:  Contact your provider or [facility] if you develop a fever, rash, cough, diarrhea or other symptoms for up to 1 year after travel.  Inform your healthcare provider when and where you traveled to.    Please call the [facility] with any questions [phone]  Or send your provider a 'My Chart' note.

## 2024-11-21 SDOH — HEALTH STABILITY: PHYSICAL HEALTH: ON AVERAGE, HOW MANY MINUTES DO YOU ENGAGE IN EXERCISE AT THIS LEVEL?: 40 MIN

## 2024-11-21 SDOH — HEALTH STABILITY: PHYSICAL HEALTH: ON AVERAGE, HOW MANY DAYS PER WEEK DO YOU ENGAGE IN MODERATE TO STRENUOUS EXERCISE (LIKE A BRISK WALK)?: 7 DAYS

## 2024-11-21 ASSESSMENT — SOCIAL DETERMINANTS OF HEALTH (SDOH): HOW OFTEN DO YOU GET TOGETHER WITH FRIENDS OR RELATIVES?: TWICE A WEEK

## 2024-11-26 ENCOUNTER — OFFICE VISIT (OUTPATIENT)
Dept: FAMILY MEDICINE | Facility: CLINIC | Age: 69
End: 2024-11-26
Payer: COMMERCIAL

## 2024-11-26 VITALS
WEIGHT: 141.13 LBS | BODY MASS INDEX: 21.39 KG/M2 | TEMPERATURE: 97.1 F | SYSTOLIC BLOOD PRESSURE: 114 MMHG | HEIGHT: 68 IN | OXYGEN SATURATION: 99 % | HEART RATE: 60 BPM | RESPIRATION RATE: 20 BRPM | DIASTOLIC BLOOD PRESSURE: 60 MMHG

## 2024-11-26 DIAGNOSIS — Z23 NEED FOR HEPATITIS B VACCINATION: ICD-10-CM

## 2024-11-26 DIAGNOSIS — E78.5 HYPERLIPIDEMIA LDL GOAL <130: ICD-10-CM

## 2024-11-26 DIAGNOSIS — E03.9 ACQUIRED HYPOTHYROIDISM: ICD-10-CM

## 2024-11-26 DIAGNOSIS — R73.09 ABNORMAL GLUCOSE: ICD-10-CM

## 2024-11-26 DIAGNOSIS — G89.29 CHRONIC PAIN OF BOTH SHOULDERS: ICD-10-CM

## 2024-11-26 DIAGNOSIS — Z00.00 MEDICARE ANNUAL WELLNESS VISIT, SUBSEQUENT: Primary | ICD-10-CM

## 2024-11-26 DIAGNOSIS — R11.0 NAUSEA: ICD-10-CM

## 2024-11-26 DIAGNOSIS — M25.512 CHRONIC PAIN OF BOTH SHOULDERS: ICD-10-CM

## 2024-11-26 DIAGNOSIS — M25.511 CHRONIC PAIN OF BOTH SHOULDERS: ICD-10-CM

## 2024-11-26 DIAGNOSIS — Z96.612 STATUS POST TOTAL REPLACEMENT OF LEFT SHOULDER: ICD-10-CM

## 2024-11-26 DIAGNOSIS — Z12.5 SCREENING FOR PROSTATE CANCER: ICD-10-CM

## 2024-11-26 LAB
ANION GAP SERPL CALCULATED.3IONS-SCNC: 7 MMOL/L (ref 7–15)
BUN SERPL-MCNC: 11.8 MG/DL (ref 8–23)
CALCIUM SERPL-MCNC: 9.4 MG/DL (ref 8.8–10.4)
CHLORIDE SERPL-SCNC: 103 MMOL/L (ref 98–107)
CHOLEST SERPL-MCNC: 151 MG/DL
CREAT SERPL-MCNC: 0.85 MG/DL (ref 0.67–1.17)
EGFRCR SERPLBLD CKD-EPI 2021: >90 ML/MIN/1.73M2
EST. AVERAGE GLUCOSE BLD GHB EST-MCNC: 100 MG/DL
FASTING STATUS PATIENT QL REPORTED: YES
FASTING STATUS PATIENT QL REPORTED: YES
GLUCOSE SERPL-MCNC: 89 MG/DL (ref 70–99)
HBA1C MFR BLD: 5.1 % (ref 0–5.6)
HCO3 SERPL-SCNC: 29 MMOL/L (ref 22–29)
HDLC SERPL-MCNC: 71 MG/DL
LDLC SERPL CALC-MCNC: 67 MG/DL
NONHDLC SERPL-MCNC: 80 MG/DL
POTASSIUM SERPL-SCNC: 4.2 MMOL/L (ref 3.4–5.3)
PSA SERPL DL<=0.01 NG/ML-MCNC: 1.5 NG/ML (ref 0–4.5)
SODIUM SERPL-SCNC: 139 MMOL/L (ref 135–145)
TRIGL SERPL-MCNC: 64 MG/DL
TSH SERPL DL<=0.005 MIU/L-ACNC: 0.47 UIU/ML (ref 0.3–4.2)

## 2024-11-26 PROCEDURE — 84443 ASSAY THYROID STIM HORMONE: CPT | Performed by: FAMILY MEDICINE

## 2024-11-26 PROCEDURE — 80048 BASIC METABOLIC PNL TOTAL CA: CPT | Performed by: FAMILY MEDICINE

## 2024-11-26 PROCEDURE — 90746 HEPB VACCINE 3 DOSE ADULT IM: CPT | Performed by: FAMILY MEDICINE

## 2024-11-26 PROCEDURE — 36415 COLL VENOUS BLD VENIPUNCTURE: CPT | Performed by: FAMILY MEDICINE

## 2024-11-26 PROCEDURE — G0103 PSA SCREENING: HCPCS | Performed by: FAMILY MEDICINE

## 2024-11-26 PROCEDURE — 90471 IMMUNIZATION ADMIN: CPT | Performed by: FAMILY MEDICINE

## 2024-11-26 PROCEDURE — 83036 HEMOGLOBIN GLYCOSYLATED A1C: CPT | Performed by: FAMILY MEDICINE

## 2024-11-26 PROCEDURE — 99214 OFFICE O/P EST MOD 30 MIN: CPT | Mod: 25 | Performed by: FAMILY MEDICINE

## 2024-11-26 PROCEDURE — 80061 LIPID PANEL: CPT | Performed by: FAMILY MEDICINE

## 2024-11-26 PROCEDURE — 99397 PER PM REEVAL EST PAT 65+ YR: CPT | Mod: 25 | Performed by: FAMILY MEDICINE

## 2024-11-26 RX ORDER — HYDROXYZINE HYDROCHLORIDE 25 MG/1
25 TABLET, FILM COATED ORAL EVERY 8 HOURS PRN
Qty: 180 TABLET | Refills: 1 | Status: SHIPPED | OUTPATIENT
Start: 2024-11-26

## 2024-11-26 RX ORDER — CYCLOBENZAPRINE HCL 5 MG
5 TABLET ORAL DAILY PRN
Qty: 90 TABLET | Refills: 1 | Status: SHIPPED | OUTPATIENT
Start: 2024-11-26

## 2024-11-26 SDOH — HEALTH STABILITY: PHYSICAL HEALTH: ON AVERAGE, HOW MANY MINUTES DO YOU ENGAGE IN EXERCISE AT THIS LEVEL?: 40 MIN

## 2024-11-26 SDOH — HEALTH STABILITY: PHYSICAL HEALTH: ON AVERAGE, HOW MANY DAYS PER WEEK DO YOU ENGAGE IN MODERATE TO STRENUOUS EXERCISE (LIKE A BRISK WALK)?: 7 DAYS

## 2024-11-26 ASSESSMENT — SOCIAL DETERMINANTS OF HEALTH (SDOH): HOW OFTEN DO YOU GET TOGETHER WITH FRIENDS OR RELATIVES?: MORE THAN THREE TIMES A WEEK

## 2024-11-26 ASSESSMENT — PAIN SCALES - GENERAL: PAINLEVEL_OUTOF10: NO PAIN (0)

## 2024-11-26 NOTE — PROGRESS NOTES
Preventive Care Visit  [facility] MELANIE Clemons MD, Family Medicine  Nov 26, 2024      Assessment & Plan     (Z00.00) Medicare annual wellness visit, subsequent  (primary encounter diagnosis)  Comment:  Reviewed the need for periodic healthcare exams and screenings.   Plan: REVIEW OF HEALTH MAINTENANCE PROTOCOL ORDERS        Advise yearly physicals.    (E78.5) Hyperlipidemia LDL goal <130  Comment: Excellent lipid profile.  No documented secondary complications.  Plan: Lipid panel reflex to direct LDL Fasting        Continue moderate intensity statin therapy.     (E03.9) Acquired hypothyroidism  Comment: Euthyroid, TSH therapeutic.  TSH   Date Value Ref Range Status   11/26/2024 0.47 0.30 - 4.20 uIU/mL Final   05/16/2023 0.92 0.40 - 4.00 mU/L Final   09/17/2020 0.16 (L) 0.40 - 4.00 mU/L Final      Plan: TSH        Continue current dose.    (M25.511,  G89.29,  M25.512) Chronic pain of both shoulders  Comment: Using intermittently.  Plan: cyclobenzaprine (FLEXERIL) 5 MG tablet        Reviewed side effects including sedation.    (R73.09) Abnormal glucose  Comment: No signs of insulin resistance.  Excellent renal function.  Plan: Basic metabolic panel  (Ca, Cl, CO2, Creat,         Gluc, K, Na, BUN), Hemoglobin A1c            (R11.0) Nausea  Comment: Upcoming travel, requesting medications for nausea.  Plan: Prescription sent for Zofran.    (Z96.612) Status post total replacement of left shoulder  Comment: Using mainly at bedtime.  Denies side effects.  Plan: hydrOXYzine HCl (ATARAX) 25 MG tablet        Continue.    (Z12.5) Screening for prostate cancer  Comment: Normal.  Plan: PSA, screen            (Z23) Need for hepatitis B vaccination  Plan: HEPATITIS B VACCINE (20 YEARS AND OLDER)         (ENGERIX-B/RECOMBIVAX)            Results for orders placed or performed in visit on 11/26/24   Hemoglobin A1c     Status: Normal   Result Value Ref Range    Estimated Average Glucose 100 <117 mg/dL     Hemoglobin A1C 5.1 0.0 - 5.6 %   PSA, screen     Status: Normal   Result Value Ref Range    Prostate Specific Antigen Screen 1.50 0.00 - 4.50 ng/mL    Narrative    This result is obtained using the Roche Elecsys total PSA method on the dave e801 immunoassay analyzer, which is an ultrasensitive method. Results obtained with different assay methods or kits cannot be used interchangeably.  This test is intended for initial prostate cancer screening. PSA values exceeding the age-specific limits are suspicious for prostate disease, but additional testing, such as prostate biopsy, is needed to diagnose prostate pathology. The American Cancer Society recommends annual examination with digital rectal examination and serum PSA beginning at age 50 and for men with a life expectancy of at least 10 years after detection of prostate cancer. For men in high-risk groups, such as  Americans or men with a first-degree relative diagnosed at a younger age, testing should begin at a younger age. It is generally recommended that information be provided to patients about the benefits and limitations of testing and treatment so they can make informed decisions.   TSH     Status: Normal   Result Value Ref Range    TSH 0.47 0.30 - 4.20 uIU/mL   Basic metabolic panel  (Ca, Cl, CO2, Creat, Gluc, K, Na, BUN)     Status: None   Result Value Ref Range    Sodium 139 135 - 145 mmol/L    Potassium 4.2 3.4 - 5.3 mmol/L    Chloride 103 98 - 107 mmol/L    Carbon Dioxide (CO2) 29 22 - 29 mmol/L    Anion Gap 7 7 - 15 mmol/L    Urea Nitrogen 11.8 8.0 - 23.0 mg/dL    Creatinine 0.85 0.67 - 1.17 mg/dL    GFR Estimate >90 >60 mL/min/1.73m2    Calcium 9.4 8.8 - 10.4 mg/dL    Glucose 89 70 - 99 mg/dL    Patient Fasting > 8hrs? Yes    Lipid panel reflex to direct LDL Fasting     Status: None   Result Value Ref Range    Cholesterol 151 <200 mg/dL    Triglycerides 64 <150 mg/dL    Direct Measure HDL 71 >=40 mg/dL    LDL Cholesterol Calculated 67 <100  mg/dL    Non HDL Cholesterol 80 <130 mg/dL    Patient Fasting > 8hrs? Yes     Narrative    Cholesterol  Desirable: < 200 mg/dL  Borderline High: 200 - 239 mg/dL  High: >= 240 mg/dL    Triglycerides  Normal: < 150 mg/dL  Borderline High: 150 - 199 mg/dL  High: 200-499 mg/dL  Very High: >= 500 mg/dL    Direct Measure HDL  Female: >= 50 mg/dL   Male: >= 40 mg/dL    LDL Cholesterol  Desirable: < 100 mg/dL  Above Desirable: 100 - 129 mg/dL   Borderline High: 130 - 159 mg/dL   High:  160 - 189 mg/dL   Very High: >= 190 mg/dL    Non HDL Cholesterol  Desirable: < 130 mg/dL  Above Desirable: 130 - 159 mg/dL  Borderline High: 160 - 189 mg/dL  High: 190 - 219 mg/dL  Very High: >= 220 mg/dL        There are no Patient Instructions on file for this visit.     Patient has been advised of split billing requirements and indicates understanding: Yes        Counseling  Appropriate preventive services were addressed with this patient via screening, questionnaire, or discussion as appropriate for fall prevention, nutrition, physical activity, Tobacco-use cessation, social engagement, weight loss and cognition.  Checklist reviewing preventive services available has been given to the patient.  Reviewed patient's diet, addressing concerns and/or questions.           Anitha Chaudhary is a 69 year old, presenting for the following:  Physical        11/26/2024     8:14 AM   Additional Questions   Roomed by Adriana Dobson CMA   Accompanied by None         11/26/2024     8:14 AM   Patient Reported Additional Medications   Patient reports taking the following new medications None          HPI    Health Care Directive  Patient does not have a Health Care Directive: Patient states has Advance Directive and will bring in a copy to clinic.      11/26/2024   General Health   How would you rate your overall physical health? Excellent   Feel stress (tense, anxious, or unable to sleep) Only a little      (!) STRESS CONCERN      11/26/2024   Nutrition    Diet: Gluten-free/reduced            11/26/2024   Exercise   Days per week of moderate/strenous exercise 7 days   Average minutes spent exercising at this level 40 min            11/26/2024   Social Factors   Frequency of gathering with friends or relatives More than three times a week   Worry food won't last until get money to buy more No   Food not last or not have enough money for food? No   Do you have housing? (Housing is defined as stable permanent housing and does not include staying ouside in a car, in a tent, in an abandoned building, in an overnight shelter, or couch-surfing.) Yes   Are you worried about losing your housing? No   Lack of transportation? No   Unable to get utilities (heat,electricity)? No            11/26/2024   Fall Risk   Fallen 2 or more times in the past year? No    Trouble with walking or balance? No        Patient-reported          11/26/2024   Activities of Daily Living- Home Safety   Needs help with the following daily activites None of the above   Safety concerns in the home None of the above            11/26/2024   Dental   Dentist two times every year? Yes            11/26/2024   Hearing Screening   Hearing concerns? None of the above            11/26/2024   Driving Risk Screening   Patient/family members have concerns about driving No            11/26/2024   General Alertness/Fatigue Screening   Have you been more tired than usual lately? No            11/26/2024   Urinary Incontinence Screening   Bothered by leaking urine in past 6 months No               Today's PHQ-2 Score:       11/26/2024     8:13 AM   PHQ-2 ( 1999 Pfizer)   Q1: Little interest or pleasure in doing things 0    Q2: Feeling down, depressed or hopeless 0    PHQ-2 Score 0    Q1: Little interest or pleasure in doing things Not at all   Q2: Feeling down, depressed or hopeless Not at all   PHQ-2 Score 0       Patient-reported           11/26/2024   Substance Use   Alcohol more than 3/day or more than 7/wk No    Do you have a current opioid prescription? No   How severe/bad is pain from 1 to 10? 2/10   Do you use any other substances recreationally? No        Social History     Tobacco Use    Smoking status: Never     Passive exposure: Never    Smokeless tobacco: Never   Vaping Use    Vaping status: Never Used   Substance Use Topics    Alcohol use: Yes     Comment: few weekly    Drug use: No           11/26/2024   AAA Screening   Family history of Abdominal Aortic Aneurysm (AAA)? No      Last PSA:   PSA   Date Value Ref Range Status   03/29/2019 1.01 0 - 4 ug/L Final     Comment:     Assay Method:  Chemiluminescence using Siemens Vista analyzer     Prostate Specific Antigen Screen   Date Value Ref Range Status   05/16/2023 1.35 0.00 - 4.00 ug/L Final     ASCVD Risk   The ASCVD Risk score (Kelton MIRANDA, et al., 2019) failed to calculate for the following reasons:    The valid HDL cholesterol range is 20 to 100 mg/dL            Reviewed and updated as needed this visit by Provider                    Labs reviewed in EPIC  Current providers sharing in care for this patient include:  Patient Care Team:  Alexa Clemons MD as PCP - General (Family Medicine)  Rahul Thorne MD as MD (Dermatology)  Lawrence Lopez DO as Assigned PCP    The following health maintenance items are reviewed in Epic and correct as of today:  Health Maintenance   Topic Date Due    HEPATITIS C SCREENING  Never done    MEDICARE ANNUAL WELLNESS VISIT  05/16/2024    LIPID  05/16/2024    ANNUAL REVIEW OF HM ORDERS  05/16/2024    TSH W/FREE T4 REFLEX  10/23/2024    FALL RISK ASSESSMENT  11/26/2025    GLUCOSE  11/11/2026    ADVANCE CARE PLANNING  11/16/2028    COLORECTAL CANCER SCREENING  03/18/2032    DTAP/TDAP/TD IMMUNIZATION (4 - Td or Tdap) 09/23/2034    PHQ-2 (once per calendar year)  Completed    INFLUENZA VACCINE  Completed    Pneumococcal Vaccine: 65+ Years  Completed    ZOSTER IMMUNIZATION  Completed    RSV VACCINE  Completed     "COVID-19 Vaccine  Completed    HPV IMMUNIZATION  Aged Out    MENINGITIS IMMUNIZATION  Aged Out    RSV MONOCLONAL ANTIBODY  Aged Out         Review of Systems  Constitutional, HEENT, cardiovascular, pulmonary, gi and gu systems are negative, except as otherwise noted.     Objective    Exam  /60   Pulse 60   Temp 97.1  F (36.2  C) (Temporal)   Resp 20   Ht 1.715 m (5' 7.5\")   Wt 64 kg (141 lb 2 oz)   SpO2 99%   BMI 21.78 kg/m     Estimated body mass index is 21.78 kg/m  as calculated from the following:    Height as of this encounter: 1.715 m (5' 7.5\").    Weight as of this encounter: 64 kg (141 lb 2 oz).    Physical Exam  GENERAL: Healthy, alert and no distress  EYES: Eyes grossly normal to inspection, conjunctivae and sclerae normal  RESP: Lungs clear to auscultation - no rales, rhonchi or wheezes  CV: Regular rate and rhythm, normal S1 S2, no murmur  MS: No gross musculoskeletal defects noted, no edema  NEURO: Normal strength and tone, mentation intact and speech normal  PSYCH: Mentation appears normal, affect normal/bright         11/26/2024   Mini Cog   Clock Draw Score 0 Abnormal   3 Item Recall 3 objects recalled   Mini Cog Total Score 3                Signed Electronically by: Alexa Clemons MD    "

## 2024-11-27 DIAGNOSIS — E03.9 ACQUIRED HYPOTHYROIDISM: Primary | ICD-10-CM

## 2024-11-27 RX ORDER — LEVOTHYROXINE, LIOTHYRONINE 57; 13.5 UG/1; UG/1
90 TABLET ORAL DAILY
Qty: 90 TABLET | Refills: 3 | Status: SHIPPED | OUTPATIENT
Start: 2024-11-27

## 2024-12-07 ENCOUNTER — MYC MEDICAL ADVICE (OUTPATIENT)
Dept: FAMILY MEDICINE | Facility: CLINIC | Age: 69
End: 2024-12-07
Payer: COMMERCIAL

## 2024-12-07 DIAGNOSIS — Z96.612 STATUS POST TOTAL REPLACEMENT OF LEFT SHOULDER: ICD-10-CM

## 2024-12-09 RX ORDER — ONDANSETRON 4 MG/1
4 TABLET, ORALLY DISINTEGRATING ORAL EVERY 6 HOURS PRN
Qty: 20 TABLET | Refills: 1 | Status: SHIPPED | OUTPATIENT
Start: 2024-12-09

## 2025-01-26 ENCOUNTER — MYC REFILL (OUTPATIENT)
Dept: FAMILY MEDICINE | Facility: CLINIC | Age: 70
End: 2025-01-26
Payer: COMMERCIAL

## 2025-01-26 DIAGNOSIS — Z71.84 TRAVEL ADVICE ENCOUNTER: ICD-10-CM

## 2025-01-27 RX ORDER — ATOVAQUONE AND PROGUANIL HYDROCHLORIDE 250; 100 MG/1; MG/1
1 TABLET, FILM COATED ORAL DAILY
Qty: 5 TABLET | Refills: 0 | Status: SHIPPED | OUTPATIENT
Start: 2025-01-27

## 2025-03-26 DIAGNOSIS — Z96.612 STATUS POST TOTAL REPLACEMENT OF LEFT SHOULDER: ICD-10-CM

## 2025-03-26 RX ORDER — HYDROXYZINE HYDROCHLORIDE 25 MG/1
TABLET, FILM COATED ORAL
Qty: 180 TABLET | Refills: 1 | Status: SHIPPED | OUTPATIENT
Start: 2025-03-26

## 2025-05-22 DIAGNOSIS — E78.5 HYPERLIPIDEMIA LDL GOAL <130: Chronic | ICD-10-CM

## 2025-05-22 RX ORDER — SIMVASTATIN 20 MG
20 TABLET ORAL AT BEDTIME
Qty: 90 TABLET | Refills: 1 | Status: SHIPPED | OUTPATIENT
Start: 2025-05-22

## 2025-07-21 DIAGNOSIS — Z96.612 STATUS POST TOTAL REPLACEMENT OF LEFT SHOULDER: ICD-10-CM

## 2025-07-21 RX ORDER — HYDROXYZINE HYDROCHLORIDE 25 MG/1
TABLET, FILM COATED ORAL
Qty: 180 TABLET | Refills: 5 | Status: SHIPPED | OUTPATIENT
Start: 2025-07-21

## (undated) DEVICE — PLATE GROUNDING ADULT W/CORD 9165L

## (undated) DEVICE — GUIDEWIRE TORNIER AEQUALIS PERFORM +  2.5X220MM DWD017

## (undated) DEVICE — PREP PAD ALCOHOL 6818

## (undated) DEVICE — HOLDER LIMB VELCRO OR 0814-1533

## (undated) DEVICE — NEEDLE SUTURE ANCHOR 1824-4DC

## (undated) DEVICE — GLOVE UNDER INDICATOR PI SZ 8.5 LF 41685

## (undated) DEVICE — SU ETHIBOND 2 V-37 4X30" MX69G

## (undated) DEVICE — SOL WATER IRRIG 1000ML BOTTLE 2F7114

## (undated) DEVICE — DRSG BANDAID 3/4X3" FABRIC CURITY LATEX FREE 44100

## (undated) DEVICE — SUTURE VICRYL+ 2-0 27IN CT-1 UND VCP259H

## (undated) DEVICE — SUTURE VICRYL+ 0 27IN CT-1 UND VCP260H

## (undated) DEVICE — SUCTION MANIFOLD NEPTUNE 2 SYS 1 PORT 702-025-000

## (undated) DEVICE — SOL NACL 0.9% IRRIG 1000ML BOTTLE 2F7124

## (undated) DEVICE — STPL SKIN PROXIMATE 35 WIDE PMW35

## (undated) DEVICE — SU FIBERWIRE 2 38" T-8 NDL  AR-7206

## (undated) DEVICE — GLOVE BIOGEL PI ULTRATOUCH G SZ 6.5 42165

## (undated) DEVICE — POSITIONER HEAD ADULT FP-HEADCR

## (undated) DEVICE — GLOVE UNDER INDICATOR PI SZ 7.0 LF 41670

## (undated) DEVICE — GUIDE PIN STERILE 3X75MM

## (undated) DEVICE — PREP CHLORAPREP W/ORANGE TINT 10.5ML 930715

## (undated) DEVICE — CUSTOM PACK OPEN SHOULDER SOP5BOSHEB

## (undated) DEVICE — GLOVE BIOGEL PI ULTRATOUCH G SZ 8.5 42185

## (undated) DEVICE — GLOVE BIOGEL PI ULTRATOUCH G SZ 7.0 42170

## (undated) DEVICE — BLADE SAGITTAL WIDE (SO-618) 2108-118

## (undated) RX ORDER — FENTANYL CITRATE 50 UG/ML
INJECTION, SOLUTION INTRAMUSCULAR; INTRAVENOUS
Status: DISPENSED
Start: 2023-11-10

## (undated) RX ORDER — ONDANSETRON 2 MG/ML
INJECTION INTRAMUSCULAR; INTRAVENOUS
Status: DISPENSED
Start: 2023-11-10

## (undated) RX ORDER — LIDOCAINE HYDROCHLORIDE 10 MG/ML
INJECTION, SOLUTION EPIDURAL; INFILTRATION; INTRACAUDAL; PERINEURAL
Status: DISPENSED
Start: 2022-03-18

## (undated) RX ORDER — GLYCOPYRROLATE 0.2 MG/ML
INJECTION, SOLUTION INTRAMUSCULAR; INTRAVENOUS
Status: DISPENSED
Start: 2022-03-18